# Patient Record
Sex: FEMALE | Race: WHITE | NOT HISPANIC OR LATINO | Employment: FULL TIME | URBAN - METROPOLITAN AREA
[De-identification: names, ages, dates, MRNs, and addresses within clinical notes are randomized per-mention and may not be internally consistent; named-entity substitution may affect disease eponyms.]

---

## 2017-04-21 ENCOUNTER — HISTORICAL (OUTPATIENT)
Dept: LAB | Facility: HOSPITAL | Age: 32
End: 2017-04-21

## 2017-04-21 LAB
BUN SERPL-MCNC: 16 MG/DL (ref 7–18)
CALCIUM SERPL-MCNC: 9 MG/DL (ref 8.5–10.1)
CHLORIDE SERPL-SCNC: 104 MMOL/L (ref 98–107)
CO2 SERPL-SCNC: 28.8 MMOL/L (ref 21–32)
CREAT SERPL-MCNC: 0.84 MG/DL (ref 0.6–1.3)
GLUCOSE SERPL-MCNC: 118 MG/DL (ref 74–106)
POTASSIUM SERPL-SCNC: 3.9 MMOL/L (ref 3.5–5.1)
SODIUM SERPL-SCNC: 141 MMOL/L (ref 136–145)

## 2019-05-31 ENCOUNTER — HISTORICAL (OUTPATIENT)
Dept: RADIOLOGY | Facility: HOSPITAL | Age: 34
End: 2019-05-31

## 2019-05-31 LAB
BUN SERPL-MCNC: 17 MG/DL (ref 7–18)
CREAT SERPL-MCNC: 1.09 MG/DL (ref 0.6–1.3)

## 2020-06-26 ENCOUNTER — HISTORICAL (OUTPATIENT)
Dept: LAB | Facility: HOSPITAL | Age: 35
End: 2020-06-26

## 2020-06-26 LAB
ABS NEUT (OLG): 3.61 X10(3)/MCL (ref 2.1–9.2)
ALBUMIN SERPL-MCNC: 4.1 GM/DL (ref 3.4–5)
ALBUMIN/GLOB SERPL: 1.2 RATIO (ref 1.1–2)
ALP SERPL-CCNC: 51 UNIT/L (ref 46–116)
ALT SERPL-CCNC: 15 UNIT/L (ref 12–78)
AST SERPL-CCNC: 15 UNIT/L (ref 15–37)
BASOPHILS # BLD AUTO: 0 X10(3)/MCL (ref 0–0.2)
BASOPHILS NFR BLD AUTO: 0 %
BILIRUB SERPL-MCNC: 0.6 MG/DL (ref 0.2–1)
BILIRUBIN DIRECT+TOT PNL SERPL-MCNC: 0.17 MG/DL (ref 0–0.2)
BILIRUBIN DIRECT+TOT PNL SERPL-MCNC: 0.43 MG/DL (ref 0–0.8)
BUN SERPL-MCNC: 12.9 MG/DL (ref 7–18)
CALCIUM SERPL-MCNC: 8.7 MG/DL (ref 8.5–10.1)
CHLORIDE SERPL-SCNC: 103 MMOL/L (ref 98–107)
CHOLEST SERPL-MCNC: 153 MG/DL (ref 0–200)
CHOLEST/HDLC SERPL: 1.9 {RATIO} (ref 0–4)
CK SERPL-CCNC: 73 UNIT/L (ref 21–232)
CO2 SERPL-SCNC: 28.5 MMOL/L (ref 21–32)
CREAT SERPL-MCNC: 0.62 MG/DL (ref 0.6–1.3)
EOSINOPHIL # BLD AUTO: 0.1 X10(3)/MCL (ref 0–0.9)
EOSINOPHIL NFR BLD AUTO: 2 %
ERYTHROCYTE [DISTWIDTH] IN BLOOD BY AUTOMATED COUNT: 13.1 % (ref 11.5–17)
GLOBULIN SER-MCNC: 3.4 GM/DL (ref 2.4–3.5)
GLUCOSE SERPL-MCNC: 104 MG/DL (ref 74–106)
HCT VFR BLD AUTO: 33 % (ref 37–47)
HDLC SERPL-MCNC: 79 MG/DL (ref 40–60)
HGB BLD-MCNC: 10.8 GM/DL (ref 12–16)
IMM GRANULOCYTES # BLD AUTO: 0.01 % (ref 0–0.02)
IMM GRANULOCYTES NFR BLD AUTO: 0.2 % (ref 0–0.43)
LDLC SERPL CALC-MCNC: 64 MG/DL (ref 0–129)
LYMPHOCYTES # BLD AUTO: 1.8 X10(3)/MCL (ref 0.6–4.6)
LYMPHOCYTES NFR BLD AUTO: 30 %
MCH RBC QN AUTO: 29.3 PG (ref 27–31)
MCHC RBC AUTO-ENTMCNC: 32.7 GM/DL (ref 33–36)
MCV RBC AUTO: 89.4 FL (ref 80–94)
MONOCYTES # BLD AUTO: 0.4 X10(3)/MCL (ref 0.1–1.3)
MONOCYTES NFR BLD AUTO: 7 %
NEUTROPHILS # BLD AUTO: 3.61 X10(3)/MCL (ref 1.4–7.9)
NEUTROPHILS NFR BLD AUTO: 60 %
PHOSPHATE SERPL-MCNC: 4.2 MG/DL (ref 2.5–4.9)
PLATELET # BLD AUTO: 251 X10(3)/MCL (ref 130–400)
PMV BLD AUTO: 10.5 FL (ref 9.4–12.4)
POTASSIUM SERPL-SCNC: 4.1 MMOL/L (ref 3.5–5.1)
PROT SERPL-MCNC: 7.5 GM/DL (ref 6.4–8.2)
RBC # BLD AUTO: 3.69 X10(6)/MCL (ref 4.2–5.4)
SODIUM SERPL-SCNC: 139 MMOL/L (ref 136–145)
TRIGL SERPL-MCNC: 48 MG/DL
TSH SERPL-ACNC: 1.93 MIU/ML (ref 0.36–3.74)
URATE SERPL-MCNC: 2.9 MG/DL (ref 2.6–7.2)
VLDLC SERPL CALC-MCNC: 10 MG/DL
WBC # SPEC AUTO: 6 X10(3)/MCL (ref 4.5–11.5)

## 2021-04-05 ENCOUNTER — HISTORICAL (OUTPATIENT)
Dept: LAB | Facility: HOSPITAL | Age: 36
End: 2021-04-05

## 2021-04-05 LAB
ABS NEUT (OLG): 3.04 X10(3)/MCL (ref 2.1–9.2)
ALBUMIN SERPL-MCNC: 4.1 GM/DL (ref 3.5–5)
ALBUMIN/GLOB SERPL: 1.3 RATIO (ref 1.1–2)
ALP SERPL-CCNC: 61 UNIT/L (ref 40–150)
ALT SERPL-CCNC: 11 UNIT/L (ref 0–55)
APPEARANCE, UA: CLEAR
AST SERPL-CCNC: 17 UNIT/L (ref 5–34)
BACTERIA SPEC CULT: NORMAL
BASOPHILS # BLD AUTO: 0 X10(3)/MCL (ref 0–0.2)
BASOPHILS NFR BLD AUTO: 1 %
BILIRUB SERPL-MCNC: 0.5 MG/DL
BILIRUB UR QL STRIP: NEGATIVE
BILIRUBIN DIRECT+TOT PNL SERPL-MCNC: 0.2 MG/DL (ref 0–0.8)
BILIRUBIN DIRECT+TOT PNL SERPL-MCNC: 0.3 MG/DL (ref 0–0.5)
BUN SERPL-MCNC: 14.6 MG/DL (ref 7–18.7)
CALCIUM SERPL-MCNC: 8.4 MG/DL (ref 8.4–10.2)
CHLORIDE SERPL-SCNC: 102 MMOL/L (ref 98–107)
CO2 SERPL-SCNC: 27 MMOL/L (ref 22–29)
COLOR UR: YELLOW
CREAT SERPL-MCNC: 0.7 MG/DL (ref 0.55–1.02)
EOSINOPHIL # BLD AUTO: 0.1 X10(3)/MCL (ref 0–0.9)
EOSINOPHIL NFR BLD AUTO: 1 %
ERYTHROCYTE [DISTWIDTH] IN BLOOD BY AUTOMATED COUNT: 15.6 % (ref 11.5–17)
GLOBULIN SER-MCNC: 3.1 GM/DL (ref 2.4–3.5)
GLUCOSE (UA): >=1000
GLUCOSE SERPL-MCNC: 79 MG/DL (ref 74–100)
HCT VFR BLD AUTO: 34.3 % (ref 37–47)
HGB BLD-MCNC: 10.3 GM/DL (ref 12–16)
HGB UR QL STRIP: NORMAL
IMM GRANULOCYTES # BLD AUTO: 0.01 % (ref 0–0.02)
IMM GRANULOCYTES NFR BLD AUTO: 0.2 % (ref 0–0.43)
KETONES UR QL STRIP: NEGATIVE
LEUKOCYTE ESTERASE UR QL STRIP: NEGATIVE
LYMPHOCYTES # BLD AUTO: 2.3 X10(3)/MCL (ref 0.6–4.6)
LYMPHOCYTES NFR BLD AUTO: 39 %
MCH RBC QN AUTO: 25.5 PG (ref 27–31)
MCHC RBC AUTO-ENTMCNC: 30 GM/DL (ref 33–36)
MCV RBC AUTO: 84.9 FL (ref 80–94)
MONOCYTES # BLD AUTO: 0.4 X10(3)/MCL (ref 0.1–1.3)
MONOCYTES NFR BLD AUTO: 6 %
NEUTROPHILS # BLD AUTO: 3.04 X10(3)/MCL (ref 1.4–7.9)
NEUTROPHILS NFR BLD AUTO: 52 %
NITRITE UR QL STRIP: NEGATIVE
PH UR STRIP: 5 [PH] (ref 5–9)
PLATELET # BLD AUTO: 275 X10(3)/MCL (ref 130–400)
PMV BLD AUTO: 10.2 FL (ref 9.4–12.4)
POTASSIUM SERPL-SCNC: 3.4 MMOL/L (ref 3.5–5.1)
PROT SERPL-MCNC: 7.2 GM/DL (ref 6.4–8.3)
PROT UR QL STRIP: NEGATIVE
RBC # BLD AUTO: 4.04 X10(6)/MCL (ref 4.2–5.4)
RBC #/AREA URNS HPF: NORMAL /HPF
SODIUM SERPL-SCNC: 139 MMOL/L (ref 136–145)
SP GR UR STRIP: >=1.03 (ref 1–1.03)
SQUAMOUS EPITHELIAL, UA: NORMAL
UROBILINOGEN UR STRIP-ACNC: 0.2
WBC # SPEC AUTO: 5.8 X10(3)/MCL (ref 4.5–11.5)
WBC #/AREA URNS HPF: NORMAL /[HPF]

## 2022-01-06 ENCOUNTER — HISTORICAL (OUTPATIENT)
Dept: LAB | Facility: HOSPITAL | Age: 37
End: 2022-01-06

## 2022-01-06 LAB
ABS NEUT (OLG): 3.14 X10(3)/MCL (ref 2.1–9.2)
ALBUMIN SERPL-MCNC: 3.9 GM/DL (ref 3.5–5)
ALBUMIN/GLOB SERPL: 1.2 RATIO (ref 1.1–2)
ALP SERPL-CCNC: 51 UNIT/L (ref 40–150)
ALT SERPL-CCNC: 18 UNIT/L (ref 0–55)
APPEARANCE, UA: CLEAR
AST SERPL-CCNC: 17 UNIT/L (ref 5–34)
BACTERIA SPEC CULT: ABNORMAL
BASOPHILS # BLD AUTO: 0 X10(3)/MCL (ref 0–0.2)
BASOPHILS NFR BLD AUTO: 0 %
BILIRUB SERPL-MCNC: 0.6 MG/DL
BILIRUB UR QL STRIP: NEGATIVE
BILIRUBIN DIRECT+TOT PNL SERPL-MCNC: 0.3 MG/DL (ref 0–0.5)
BILIRUBIN DIRECT+TOT PNL SERPL-MCNC: 0.3 MG/DL (ref 0–0.8)
BUN SERPL-MCNC: 10.8 MG/DL (ref 7–18.7)
CALCIUM SERPL-MCNC: 9.2 MG/DL (ref 8.7–10.5)
CHLORIDE SERPL-SCNC: 105 MMOL/L (ref 98–107)
CHOLEST SERPL-MCNC: 143 MG/DL
CHOLEST/HDLC SERPL: 2 {RATIO} (ref 0–5)
CO2 SERPL-SCNC: 27 MMOL/L (ref 22–29)
COLOR UR: YELLOW
CREAT SERPL-MCNC: 0.7 MG/DL (ref 0.55–1.02)
DEPRECATED CALCIDIOL+CALCIFEROL SERPL-MC: 74 NG/ML (ref 30–80)
EOSINOPHIL # BLD AUTO: 0.2 X10(3)/MCL (ref 0–0.9)
EOSINOPHIL NFR BLD AUTO: 3 %
ERYTHROCYTE [DISTWIDTH] IN BLOOD BY AUTOMATED COUNT: 12.4 % (ref 11.5–17)
EST. AVERAGE GLUCOSE BLD GHB EST-MCNC: 108.3 MG/DL
GLOBULIN SER-MCNC: 3.3 GM/DL (ref 2.4–3.5)
GLUCOSE (UA): ABNORMAL
GLUCOSE SERPL-MCNC: 90 MG/DL (ref 74–100)
HBA1C MFR BLD: 5.4 %
HCT VFR BLD AUTO: 36.8 % (ref 37–47)
HDLC SERPL-MCNC: 63 MG/DL (ref 35–60)
HGB BLD-MCNC: 11.9 GM/DL (ref 12–16)
HGB UR QL STRIP: NEGATIVE
KETONES UR QL STRIP: NEGATIVE
LDLC SERPL CALC-MCNC: 65 MG/DL (ref 50–140)
LEUKOCYTE ESTERASE UR QL STRIP: NEGATIVE
LYMPHOCYTES # BLD AUTO: 1.8 X10(3)/MCL (ref 0.6–4.6)
LYMPHOCYTES NFR BLD AUTO: 33 %
MCH RBC QN AUTO: 30.3 PG (ref 27–31)
MCHC RBC AUTO-ENTMCNC: 32.3 GM/DL (ref 33–36)
MCV RBC AUTO: 93.6 FL (ref 80–94)
MONOCYTES # BLD AUTO: 0.5 X10(3)/MCL (ref 0.1–1.3)
MONOCYTES NFR BLD AUTO: 9 %
NEUTROPHILS # BLD AUTO: 3.14 X10(3)/MCL (ref 1.4–7.9)
NEUTROPHILS NFR BLD AUTO: 56 %
NITRITE UR QL STRIP: NEGATIVE
PH UR STRIP: 5.5 [PH] (ref 5–9)
PLATELET # BLD AUTO: 234 X10(3)/MCL (ref 130–400)
PMV BLD AUTO: 10.3 FL (ref 9.4–12.4)
POTASSIUM SERPL-SCNC: 4 MMOL/L (ref 3.5–5.1)
PROT SERPL-MCNC: 7.2 GM/DL (ref 6.4–8.3)
PROT UR QL STRIP: NEGATIVE
RBC # BLD AUTO: 3.93 X10(6)/MCL (ref 4.2–5.4)
RBC #/AREA URNS HPF: ABNORMAL /[HPF]
SODIUM SERPL-SCNC: 140 MMOL/L (ref 136–145)
SP GR UR STRIP: >1.03 (ref 1–1.03)
SQUAMOUS EPITHELIAL, UA: ABNORMAL
TRIGL SERPL-MCNC: 73 MG/DL (ref 37–140)
TSH SERPL-ACNC: 1.35 UIU/ML (ref 0.35–4.94)
UROBILINOGEN UR STRIP-ACNC: 1
VIT B12 SERPL-MCNC: 194 PG/ML (ref 213–816)
VLDLC SERPL CALC-MCNC: 15 MG/DL
WBC # SPEC AUTO: 5.6 X10(3)/MCL (ref 4.5–11.5)
WBC #/AREA URNS HPF: ABNORMAL /HPF

## 2022-04-12 ENCOUNTER — HISTORICAL (OUTPATIENT)
Dept: ADMINISTRATIVE | Facility: HOSPITAL | Age: 37
End: 2022-04-12

## 2022-04-30 VITALS
OXYGEN SATURATION: 97 % | BODY MASS INDEX: 31.98 KG/M2 | SYSTOLIC BLOOD PRESSURE: 120 MMHG | WEIGHT: 211 LBS | DIASTOLIC BLOOD PRESSURE: 78 MMHG | HEIGHT: 68 IN

## 2022-06-04 PROBLEM — I10 HYPERTENSION: Status: ACTIVE | Noted: 2022-06-04

## 2022-06-04 PROBLEM — Z00.00 WELLNESS EXAMINATION: Status: ACTIVE | Noted: 2022-06-04

## 2022-06-04 PROBLEM — E11.9 DIABETES MELLITUS: Status: ACTIVE | Noted: 2022-06-04

## 2022-06-04 PROBLEM — M54.9 BACK PAIN: Status: ACTIVE | Noted: 2022-06-04

## 2022-06-04 PROBLEM — F41.9 ANXIETY: Status: ACTIVE | Noted: 2022-06-04

## 2022-06-04 PROBLEM — E55.9 VITAMIN D DEFICIENCY: Status: ACTIVE | Noted: 2022-06-04

## 2022-06-04 PROBLEM — E66.9 OBESITY: Status: ACTIVE | Noted: 2022-06-04

## 2022-06-10 PROBLEM — Z20.822 CONTACT WITH AND (SUSPECTED) EXPOSURE TO COVID-19: Status: ACTIVE | Noted: 2022-02-01

## 2022-06-10 PROBLEM — Z11.8 SCREENING FOR OTHER SPECIFIC VIRAL AND CHLAMYDIAL DISEASES: Status: ACTIVE | Noted: 2022-01-29

## 2022-06-10 PROBLEM — R03.0 ELEVATED BLOOD PRESSURE READING WITHOUT DIAGNOSIS OF HYPERTENSION: Status: ACTIVE | Noted: 2022-06-10

## 2022-06-10 PROBLEM — J06.9 URI, ACUTE: Status: ACTIVE | Noted: 2022-01-29

## 2022-06-10 PROBLEM — Z11.59 SCREENING FOR OTHER SPECIFIC VIRAL AND CHLAMYDIAL DISEASES: Status: ACTIVE | Noted: 2022-01-29

## 2022-06-10 PROBLEM — E78.5 HYPERLIPIDEMIA: Status: ACTIVE | Noted: 2022-06-10

## 2022-12-11 PROBLEM — R03.0 ELEVATED BLOOD PRESSURE READING WITHOUT DIAGNOSIS OF HYPERTENSION: Status: RESOLVED | Noted: 2022-06-10 | Resolved: 2022-12-11

## 2023-01-06 ENCOUNTER — LAB VISIT (OUTPATIENT)
Dept: LAB | Facility: HOSPITAL | Age: 38
End: 2023-01-06
Attending: FAMILY MEDICINE
Payer: COMMERCIAL

## 2023-01-06 DIAGNOSIS — Z00.00 WELLNESS EXAMINATION: ICD-10-CM

## 2023-01-06 LAB
ALBUMIN SERPL-MCNC: 4 G/DL (ref 3.5–5)
ALBUMIN/GLOB SERPL: 1.2 RATIO (ref 1.1–2)
ALP SERPL-CCNC: 45 UNIT/L (ref 40–150)
ALT SERPL-CCNC: 12 UNIT/L (ref 0–55)
APPEARANCE UR: CLEAR
AST SERPL-CCNC: 17 UNIT/L (ref 5–34)
BACTERIA #/AREA URNS AUTO: NORMAL /HPF
BASOPHILS # BLD AUTO: 0.02 X10(3)/MCL (ref 0–0.2)
BASOPHILS NFR BLD AUTO: 0.3 %
BILIRUB UR QL STRIP.AUTO: NEGATIVE MG/DL
BILIRUBIN DIRECT+TOT PNL SERPL-MCNC: 1.1 MG/DL
BUN SERPL-MCNC: 12.2 MG/DL (ref 7–18.7)
CALCIUM SERPL-MCNC: 9.7 MG/DL (ref 8.4–10.2)
CHLORIDE SERPL-SCNC: 106 MMOL/L (ref 98–107)
CHOLEST SERPL-MCNC: 142 MG/DL
CHOLEST/HDLC SERPL: 2 {RATIO} (ref 0–5)
CO2 SERPL-SCNC: 26 MMOL/L (ref 22–29)
COLOR UR AUTO: YELLOW
CREAT SERPL-MCNC: 0.71 MG/DL (ref 0.55–1.02)
DEPRECATED CALCIDIOL+CALCIFEROL SERPL-MC: 82.6 NG/ML (ref 30–80)
EOSINOPHIL # BLD AUTO: 0.09 X10(3)/MCL (ref 0–0.9)
EOSINOPHIL NFR BLD AUTO: 1.5 %
ERYTHROCYTE [DISTWIDTH] IN BLOOD BY AUTOMATED COUNT: 12.7 % (ref 11–14.5)
EST. AVERAGE GLUCOSE BLD GHB EST-MCNC: 108.3 MG/DL
GFR SERPLBLD CREATININE-BSD FMLA CKD-EPI: >60 MLS/MIN/1.73/M2
GLOBULIN SER-MCNC: 3.3 GM/DL (ref 2.4–3.5)
GLUCOSE SERPL-MCNC: 100 MG/DL (ref 74–100)
GLUCOSE UR QL STRIP.AUTO: >=1000 MG/DL
HBA1C MFR BLD: 5.4 %
HCT VFR BLD AUTO: 37.6 % (ref 37–47)
HDLC SERPL-MCNC: 64 MG/DL (ref 35–60)
HGB BLD-MCNC: 11.5 GM/DL (ref 12–16)
IMM GRANULOCYTES # BLD AUTO: 0.01 X10(3)/MCL (ref 0–0.04)
IMM GRANULOCYTES NFR BLD AUTO: 0.2 %
KETONES UR QL STRIP.AUTO: NEGATIVE MG/DL
LDLC SERPL CALC-MCNC: 67 MG/DL (ref 50–140)
LEUKOCYTE ESTERASE UR QL STRIP.AUTO: NEGATIVE UNIT/L
LYMPHOCYTES # BLD AUTO: 2.22 X10(3)/MCL (ref 0.6–4.6)
LYMPHOCYTES NFR BLD AUTO: 37.2 %
MCH RBC QN AUTO: 28.2 PG
MCHC RBC AUTO-ENTMCNC: 30.6 MG/DL (ref 33–36)
MCV RBC AUTO: 92.2 FL (ref 80–94)
MONOCYTES # BLD AUTO: 0.43 X10(3)/MCL (ref 0.1–1.3)
MONOCYTES NFR BLD AUTO: 7.2 %
NEUTROPHILS # BLD AUTO: 3.2 X10(3)/MCL (ref 2.1–9.2)
NEUTROPHILS NFR BLD AUTO: 53.6 %
NITRITE UR QL STRIP.AUTO: NEGATIVE
PH UR STRIP.AUTO: 5 [PH]
PLATELET # BLD AUTO: 295 X10(3)/MCL (ref 140–371)
PMV BLD AUTO: 10.1 FL (ref 9.4–12.4)
POTASSIUM SERPL-SCNC: 3.9 MMOL/L (ref 3.5–5.1)
PROT SERPL-MCNC: 7.3 GM/DL (ref 6.4–8.3)
PROT UR QL STRIP.AUTO: NEGATIVE MG/DL
RBC # BLD AUTO: 4.08 X10(6)/MCL (ref 4.2–5.4)
RBC #/AREA URNS AUTO: NORMAL /HPF
RBC UR QL AUTO: NEGATIVE UNIT/L
SODIUM SERPL-SCNC: 140 MMOL/L (ref 136–145)
SP GR UR STRIP.AUTO: >=1.03
SQUAMOUS #/AREA URNS AUTO: NORMAL /HPF
TRIGL SERPL-MCNC: 54 MG/DL (ref 37–140)
TSH SERPL-ACNC: 1.7 UIU/ML (ref 0.35–4.94)
UROBILINOGEN UR STRIP-ACNC: 0.2 MG/DL
VIT B12 SERPL-MCNC: 1815 PG/ML (ref 213–816)
VLDLC SERPL CALC-MCNC: 11 MG/DL
WBC # SPEC AUTO: 6 X10(3)/MCL (ref 4.5–11.5)
WBC #/AREA URNS AUTO: NORMAL /HPF

## 2023-01-06 PROCEDURE — 85025 COMPLETE CBC W/AUTO DIFF WBC: CPT

## 2023-01-06 PROCEDURE — 81001 URINALYSIS AUTO W/SCOPE: CPT

## 2023-01-06 PROCEDURE — 83036 HEMOGLOBIN GLYCOSYLATED A1C: CPT

## 2023-01-06 PROCEDURE — 82607 VITAMIN B-12: CPT

## 2023-01-06 PROCEDURE — 36415 COLL VENOUS BLD VENIPUNCTURE: CPT

## 2023-01-06 PROCEDURE — 82306 VITAMIN D 25 HYDROXY: CPT

## 2023-01-06 PROCEDURE — 84443 ASSAY THYROID STIM HORMONE: CPT

## 2023-01-06 PROCEDURE — 80053 COMPREHEN METABOLIC PANEL: CPT

## 2023-01-06 PROCEDURE — 80061 LIPID PANEL: CPT

## 2023-12-08 PROBLEM — Z00.00 WELLNESS EXAMINATION: Status: ACTIVE | Noted: 2022-01-29

## 2024-01-06 ENCOUNTER — LAB VISIT (OUTPATIENT)
Dept: LAB | Facility: HOSPITAL | Age: 39
End: 2024-01-06
Attending: FAMILY MEDICINE
Payer: COMMERCIAL

## 2024-01-06 DIAGNOSIS — Z00.00 WELLNESS EXAMINATION: ICD-10-CM

## 2024-01-06 LAB
ALBUMIN SERPL-MCNC: 3.8 G/DL (ref 3.5–5)
ALBUMIN/GLOB SERPL: 1.1 RATIO (ref 1.1–2)
ALP SERPL-CCNC: 55 UNIT/L (ref 40–150)
ALT SERPL-CCNC: 15 UNIT/L (ref 0–55)
APPEARANCE UR: CLEAR
AST SERPL-CCNC: 20 UNIT/L (ref 5–34)
BACTERIA #/AREA URNS AUTO: NORMAL /HPF
BASOPHILS # BLD AUTO: 0.03 X10(3)/MCL
BASOPHILS NFR BLD AUTO: 0.6 %
BILIRUB SERPL-MCNC: 1 MG/DL
BILIRUB UR QL STRIP.AUTO: NEGATIVE
BUN SERPL-MCNC: 13.7 MG/DL (ref 7–18.7)
CALCIUM SERPL-MCNC: 9.4 MG/DL (ref 8.4–10.2)
CHLORIDE SERPL-SCNC: 103 MMOL/L (ref 98–107)
CHOLEST SERPL-MCNC: 159 MG/DL
CHOLEST/HDLC SERPL: 2 {RATIO} (ref 0–5)
CO2 SERPL-SCNC: 25 MMOL/L (ref 22–29)
COLOR UR AUTO: YELLOW
CREAT SERPL-MCNC: 0.67 MG/DL (ref 0.55–1.02)
DEPRECATED CALCIDIOL+CALCIFEROL SERPL-MC: 74.9 NG/ML (ref 30–80)
EOSINOPHIL # BLD AUTO: 0.1 X10(3)/MCL (ref 0–0.9)
EOSINOPHIL NFR BLD AUTO: 1.9 %
ERYTHROCYTE [DISTWIDTH] IN BLOOD BY AUTOMATED COUNT: 12.7 % (ref 11.5–17)
EST. AVERAGE GLUCOSE BLD GHB EST-MCNC: 111.2 MG/DL
GFR SERPLBLD CREATININE-BSD FMLA CKD-EPI: >60 MLS/MIN/1.73/M2
GLOBULIN SER-MCNC: 3.6 GM/DL (ref 2.4–3.5)
GLUCOSE SERPL-MCNC: 93 MG/DL (ref 74–100)
GLUCOSE UR QL STRIP.AUTO: >=1000
HBA1C MFR BLD: 5.5 %
HCT VFR BLD AUTO: 37.2 % (ref 37–47)
HDLC SERPL-MCNC: 78 MG/DL (ref 35–60)
HGB BLD-MCNC: 11.9 G/DL (ref 12–16)
IMM GRANULOCYTES # BLD AUTO: 0 X10(3)/MCL (ref 0–0.04)
IMM GRANULOCYTES NFR BLD AUTO: 0 %
KETONES UR QL STRIP.AUTO: NEGATIVE
LDLC SERPL CALC-MCNC: 73 MG/DL (ref 50–140)
LEUKOCYTE ESTERASE UR QL STRIP.AUTO: NEGATIVE
LYMPHOCYTES # BLD AUTO: 1.55 X10(3)/MCL (ref 0.6–4.6)
LYMPHOCYTES NFR BLD AUTO: 29.9 %
MCH RBC QN AUTO: 29.5 PG (ref 27–31)
MCHC RBC AUTO-ENTMCNC: 32 G/DL (ref 33–36)
MCV RBC AUTO: 92.3 FL (ref 80–94)
MONOCYTES # BLD AUTO: 0.47 X10(3)/MCL (ref 0.1–1.3)
MONOCYTES NFR BLD AUTO: 9.1 %
NEUTROPHILS # BLD AUTO: 3.03 X10(3)/MCL (ref 2.1–9.2)
NEUTROPHILS NFR BLD AUTO: 58.5 %
NITRITE UR QL STRIP.AUTO: NEGATIVE
PH UR STRIP.AUTO: 7 [PH]
PLATELET # BLD AUTO: 231 X10(3)/MCL (ref 130–400)
PMV BLD AUTO: 9.9 FL (ref 7.4–10.4)
POTASSIUM SERPL-SCNC: 4.1 MMOL/L (ref 3.5–5.1)
PROT SERPL-MCNC: 7.4 GM/DL (ref 6.4–8.3)
PROT UR QL STRIP.AUTO: NEGATIVE
RBC # BLD AUTO: 4.03 X10(6)/MCL (ref 4.2–5.4)
RBC #/AREA URNS AUTO: NORMAL /HPF
RBC UR QL AUTO: NEGATIVE
SODIUM SERPL-SCNC: 138 MMOL/L (ref 136–145)
SP GR UR STRIP.AUTO: 1.02 (ref 1–1.03)
SQUAMOUS #/AREA URNS AUTO: NORMAL /HPF
T3FREE SERPL-MCNC: 2.89 PG/ML (ref 1.58–3.91)
T4 FREE SERPL-MCNC: 1.08 NG/DL (ref 0.7–1.48)
TRIGL SERPL-MCNC: 40 MG/DL (ref 37–140)
TSH SERPL-ACNC: 1.1 UIU/ML (ref 0.35–4.94)
UROBILINOGEN UR STRIP-ACNC: 1
VLDLC SERPL CALC-MCNC: 8 MG/DL
WBC # SPEC AUTO: 5.18 X10(3)/MCL (ref 4.5–11.5)
WBC #/AREA URNS AUTO: NORMAL /HPF

## 2024-01-06 PROCEDURE — 80053 COMPREHEN METABOLIC PANEL: CPT

## 2024-01-06 PROCEDURE — 36415 COLL VENOUS BLD VENIPUNCTURE: CPT

## 2024-01-06 PROCEDURE — 84443 ASSAY THYROID STIM HORMONE: CPT

## 2024-01-06 PROCEDURE — 82306 VITAMIN D 25 HYDROXY: CPT

## 2024-01-06 PROCEDURE — 84481 FREE ASSAY (FT-3): CPT

## 2024-01-06 PROCEDURE — 81003 URINALYSIS AUTO W/O SCOPE: CPT

## 2024-01-06 PROCEDURE — 84439 ASSAY OF FREE THYROXINE: CPT

## 2024-01-06 PROCEDURE — 83036 HEMOGLOBIN GLYCOSYLATED A1C: CPT

## 2024-01-06 PROCEDURE — 80061 LIPID PANEL: CPT

## 2024-01-06 PROCEDURE — 85025 COMPLETE CBC W/AUTO DIFF WBC: CPT

## 2024-03-11 PROBLEM — Z00.00 WELLNESS EXAMINATION: Status: RESOLVED | Noted: 2022-01-29 | Resolved: 2024-03-11

## 2025-01-18 ENCOUNTER — LAB VISIT (OUTPATIENT)
Dept: LAB | Facility: HOSPITAL | Age: 40
End: 2025-01-18
Attending: FAMILY MEDICINE
Payer: COMMERCIAL

## 2025-01-18 DIAGNOSIS — Z00.00 WELLNESS EXAMINATION: ICD-10-CM

## 2025-01-18 LAB
25(OH)D3+25(OH)D2 SERPL-MCNC: 74 NG/ML (ref 30–80)
ALBUMIN SERPL-MCNC: 3.9 G/DL (ref 3.5–5)
ALBUMIN/GLOB SERPL: 1.2 RATIO (ref 1.1–2)
ALP SERPL-CCNC: 46 UNIT/L (ref 40–150)
ALT SERPL-CCNC: 15 UNIT/L (ref 0–55)
ANION GAP SERPL CALC-SCNC: 5 MEQ/L
AST SERPL-CCNC: 17 UNIT/L (ref 5–34)
BACTERIA #/AREA URNS AUTO: ABNORMAL /HPF
BASOPHILS # BLD AUTO: 0.03 X10(3)/MCL
BASOPHILS NFR BLD AUTO: 0.5 %
BILIRUB SERPL-MCNC: 0.8 MG/DL
BILIRUB UR QL STRIP.AUTO: NEGATIVE
BUN SERPL-MCNC: 13.2 MG/DL (ref 7–18.7)
CALCIUM SERPL-MCNC: 9.2 MG/DL (ref 8.4–10.2)
CHLORIDE SERPL-SCNC: 107 MMOL/L (ref 98–107)
CHOLEST SERPL-MCNC: 143 MG/DL
CHOLEST/HDLC SERPL: 2 {RATIO} (ref 0–5)
CLARITY UR: CLEAR
CO2 SERPL-SCNC: 28 MMOL/L (ref 22–29)
COLOR UR AUTO: YELLOW
CREAT SERPL-MCNC: 0.73 MG/DL (ref 0.55–1.02)
CREAT UR-MCNC: 137.7 MG/DL (ref 45–106)
CREAT/UREA NIT SERPL: 18
EOSINOPHIL # BLD AUTO: 0.07 X10(3)/MCL (ref 0–0.9)
EOSINOPHIL NFR BLD AUTO: 1.3 %
ERYTHROCYTE [DISTWIDTH] IN BLOOD BY AUTOMATED COUNT: 14.5 % (ref 11.5–17)
EST. AVERAGE GLUCOSE BLD GHB EST-MCNC: 108.3 MG/DL
FOLATE SERPL-MCNC: 8.3 NG/ML (ref 7–31.4)
GFR SERPLBLD CREATININE-BSD FMLA CKD-EPI: >60 ML/MIN/1.73/M2
GLOBULIN SER-MCNC: 3.2 GM/DL (ref 2.4–3.5)
GLUCOSE SERPL-MCNC: 97 MG/DL (ref 74–100)
GLUCOSE UR QL STRIP: >=1000
HBA1C MFR BLD: 5.4 %
HCT VFR BLD AUTO: 36.4 % (ref 37–47)
HDLC SERPL-MCNC: 76 MG/DL (ref 35–60)
HGB BLD-MCNC: 11.9 G/DL (ref 12–16)
HGB UR QL STRIP: NEGATIVE
IMM GRANULOCYTES # BLD AUTO: 0.01 X10(3)/MCL (ref 0–0.04)
IMM GRANULOCYTES NFR BLD AUTO: 0.2 %
KETONES UR QL STRIP: NEGATIVE
LDLC SERPL CALC-MCNC: 60 MG/DL (ref 50–140)
LEUKOCYTE ESTERASE UR QL STRIP: NEGATIVE
LYMPHOCYTES # BLD AUTO: 1.52 X10(3)/MCL (ref 0.6–4.6)
LYMPHOCYTES NFR BLD AUTO: 27.3 %
MCH RBC QN AUTO: 30.7 PG (ref 27–31)
MCHC RBC AUTO-ENTMCNC: 32.7 G/DL (ref 33–36)
MCV RBC AUTO: 93.8 FL (ref 80–94)
MICROALBUMIN UR-MCNC: 9.4 UG/ML
MICROALBUMIN/CREAT RATIO PNL UR: 6.8 MG/GM CR (ref 0–30)
MONOCYTES # BLD AUTO: 0.36 X10(3)/MCL (ref 0.1–1.3)
MONOCYTES NFR BLD AUTO: 6.5 %
NEUTROPHILS # BLD AUTO: 3.57 X10(3)/MCL (ref 2.1–9.2)
NEUTROPHILS NFR BLD AUTO: 64.2 %
NITRITE UR QL STRIP: NEGATIVE
PH UR STRIP: 6 [PH]
PLATELET # BLD AUTO: 207 X10(3)/MCL (ref 130–400)
PMV BLD AUTO: 10.5 FL (ref 7.4–10.4)
POTASSIUM SERPL-SCNC: 4.5 MMOL/L (ref 3.5–5.1)
PROT SERPL-MCNC: 7.1 GM/DL (ref 6.4–8.3)
PROT UR QL STRIP: NEGATIVE
RBC # BLD AUTO: 3.88 X10(6)/MCL (ref 4.2–5.4)
RBC #/AREA URNS AUTO: ABNORMAL /HPF
SODIUM SERPL-SCNC: 140 MMOL/L (ref 136–145)
SP GR UR STRIP.AUTO: >=1.03 (ref 1–1.03)
SQUAMOUS #/AREA URNS AUTO: ABNORMAL /HPF
T3FREE SERPL-MCNC: 2.56 PG/ML (ref 1.58–3.91)
T4 FREE SERPL-MCNC: 1.06 NG/DL (ref 0.7–1.48)
TRIGL SERPL-MCNC: 33 MG/DL (ref 37–140)
TSH SERPL-ACNC: 1.72 UIU/ML (ref 0.35–4.94)
UROBILINOGEN UR STRIP-ACNC: 1
VIT B12 SERPL-MCNC: >2000 PG/ML (ref 213–816)
VLDLC SERPL CALC-MCNC: 7 MG/DL
WBC # BLD AUTO: 5.56 X10(3)/MCL (ref 4.5–11.5)
WBC #/AREA URNS AUTO: ABNORMAL /HPF

## 2025-01-18 PROCEDURE — 82306 VITAMIN D 25 HYDROXY: CPT

## 2025-01-18 PROCEDURE — 80053 COMPREHEN METABOLIC PANEL: CPT

## 2025-01-18 PROCEDURE — 83036 HEMOGLOBIN GLYCOSYLATED A1C: CPT

## 2025-01-18 PROCEDURE — 82746 ASSAY OF FOLIC ACID SERUM: CPT

## 2025-01-18 PROCEDURE — 85025 COMPLETE CBC W/AUTO DIFF WBC: CPT

## 2025-01-18 PROCEDURE — 82043 UR ALBUMIN QUANTITATIVE: CPT

## 2025-01-18 PROCEDURE — 84439 ASSAY OF FREE THYROXINE: CPT

## 2025-01-18 PROCEDURE — 81003 URINALYSIS AUTO W/O SCOPE: CPT

## 2025-01-18 PROCEDURE — 80061 LIPID PANEL: CPT

## 2025-01-18 PROCEDURE — 84481 FREE ASSAY (FT-3): CPT

## 2025-01-18 PROCEDURE — 36415 COLL VENOUS BLD VENIPUNCTURE: CPT

## 2025-01-18 PROCEDURE — 84443 ASSAY THYROID STIM HORMONE: CPT

## 2025-01-18 PROCEDURE — 82607 VITAMIN B-12: CPT

## 2025-02-19 ENCOUNTER — HOSPITAL ENCOUNTER (EMERGENCY)
Facility: HOSPITAL | Age: 40
Discharge: ANOTHER HOSPITAL | End: 2025-02-20
Attending: EMERGENCY MEDICINE
Payer: COMMERCIAL

## 2025-02-19 DIAGNOSIS — R50.9 FEVER, UNSPECIFIED FEVER CAUSE: ICD-10-CM

## 2025-02-19 DIAGNOSIS — K81.0 ACUTE CHOLECYSTITIS: Primary | ICD-10-CM

## 2025-02-19 DIAGNOSIS — R10.11 RIGHT UPPER QUADRANT ABDOMINAL PAIN: ICD-10-CM

## 2025-02-19 LAB
ALBUMIN SERPL-MCNC: 3.8 G/DL (ref 3.5–5)
ALBUMIN/GLOB SERPL: 1.1 RATIO (ref 1.1–2)
ALP SERPL-CCNC: 40 UNIT/L (ref 40–150)
ALT SERPL-CCNC: 13 UNIT/L (ref 0–55)
ANION GAP SERPL CALC-SCNC: 11 MEQ/L
AST SERPL-CCNC: 18 UNIT/L (ref 5–34)
BACTERIA #/AREA URNS AUTO: ABNORMAL /HPF
BASOPHILS # BLD AUTO: 0.02 X10(3)/MCL
BASOPHILS NFR BLD AUTO: 0.2 %
BILIRUB SERPL-MCNC: 1.4 MG/DL
BILIRUB UR QL STRIP.AUTO: ABNORMAL
BUN SERPL-MCNC: 18.9 MG/DL (ref 7–18.7)
CALCIUM SERPL-MCNC: 8.8 MG/DL (ref 8.4–10.2)
CHLORIDE SERPL-SCNC: 104 MMOL/L (ref 98–107)
CLARITY UR: CLEAR
CO2 SERPL-SCNC: 23 MMOL/L (ref 22–29)
COLOR UR AUTO: YELLOW
CREAT SERPL-MCNC: 0.65 MG/DL (ref 0.55–1.02)
CREAT/UREA NIT SERPL: 29
EOSINOPHIL # BLD AUTO: 0 X10(3)/MCL (ref 0–0.9)
EOSINOPHIL NFR BLD AUTO: 0 %
ERYTHROCYTE [DISTWIDTH] IN BLOOD BY AUTOMATED COUNT: 13.3 % (ref 11.5–17)
GFR SERPLBLD CREATININE-BSD FMLA CKD-EPI: >60 ML/MIN/1.73/M2
GLOBULIN SER-MCNC: 3.5 GM/DL (ref 2.4–3.5)
GLUCOSE SERPL-MCNC: 98 MG/DL (ref 74–100)
GLUCOSE UR QL STRIP: 500
HCT VFR BLD AUTO: 38.1 % (ref 37–47)
HGB BLD-MCNC: 12.8 G/DL (ref 12–16)
HGB UR QL STRIP: ABNORMAL
IMM GRANULOCYTES # BLD AUTO: 0.01 X10(3)/MCL (ref 0–0.04)
IMM GRANULOCYTES NFR BLD AUTO: 0.1 %
KETONES UR QL STRIP: >=80
LACTATE SERPL-SCNC: 1.1 MMOL/L (ref 0.5–2.2)
LEUKOCYTE ESTERASE UR QL STRIP: NEGATIVE
LIPASE SERPL-CCNC: 18 U/L
LYMPHOCYTES # BLD AUTO: 0.19 X10(3)/MCL (ref 0.6–4.6)
LYMPHOCYTES NFR BLD AUTO: 2.1 %
MCH RBC QN AUTO: 31.4 PG (ref 27–31)
MCHC RBC AUTO-ENTMCNC: 33.6 G/DL (ref 33–36)
MCV RBC AUTO: 93.6 FL (ref 80–94)
MONOCYTES # BLD AUTO: 0.25 X10(3)/MCL (ref 0.1–1.3)
MONOCYTES NFR BLD AUTO: 2.8 %
NEUTROPHILS # BLD AUTO: 8.51 X10(3)/MCL (ref 2.1–9.2)
NEUTROPHILS NFR BLD AUTO: 94.8 %
NITRITE UR QL STRIP: NEGATIVE
PH UR STRIP: 5.5 [PH]
PLATELET # BLD AUTO: 195 X10(3)/MCL (ref 130–400)
PMV BLD AUTO: 10.1 FL (ref 7.4–10.4)
POTASSIUM SERPL-SCNC: 3.8 MMOL/L (ref 3.5–5.1)
PROT SERPL-MCNC: 7.3 GM/DL (ref 6.4–8.3)
PROT UR QL STRIP: NEGATIVE
RBC # BLD AUTO: 4.07 X10(6)/MCL (ref 4.2–5.4)
RBC #/AREA URNS AUTO: ABNORMAL /HPF
SODIUM SERPL-SCNC: 138 MMOL/L (ref 136–145)
SP GR UR STRIP.AUTO: >=1.03 (ref 1–1.03)
SQUAMOUS #/AREA URNS AUTO: ABNORMAL /HPF
UROBILINOGEN UR STRIP-ACNC: 0.2
WBC # BLD AUTO: 8.98 X10(3)/MCL (ref 4.5–11.5)
WBC #/AREA URNS AUTO: ABNORMAL /HPF

## 2025-02-19 PROCEDURE — 87040 BLOOD CULTURE FOR BACTERIA: CPT | Performed by: EMERGENCY MEDICINE

## 2025-02-19 PROCEDURE — 83690 ASSAY OF LIPASE: CPT | Performed by: EMERGENCY MEDICINE

## 2025-02-19 PROCEDURE — 80053 COMPREHEN METABOLIC PANEL: CPT | Performed by: EMERGENCY MEDICINE

## 2025-02-19 PROCEDURE — 85025 COMPLETE CBC W/AUTO DIFF WBC: CPT | Performed by: EMERGENCY MEDICINE

## 2025-02-19 PROCEDURE — 81003 URINALYSIS AUTO W/O SCOPE: CPT | Performed by: EMERGENCY MEDICINE

## 2025-02-19 PROCEDURE — 99285 EMERGENCY DEPT VISIT HI MDM: CPT | Mod: 25

## 2025-02-19 PROCEDURE — 83605 ASSAY OF LACTIC ACID: CPT | Performed by: EMERGENCY MEDICINE

## 2025-02-19 PROCEDURE — 96361 HYDRATE IV INFUSION ADD-ON: CPT

## 2025-02-19 PROCEDURE — 25000003 PHARM REV CODE 250: Performed by: EMERGENCY MEDICINE

## 2025-02-19 PROCEDURE — 25500020 PHARM REV CODE 255: Performed by: EMERGENCY MEDICINE

## 2025-02-19 PROCEDURE — 63600175 PHARM REV CODE 636 W HCPCS: Performed by: EMERGENCY MEDICINE

## 2025-02-19 PROCEDURE — 96365 THER/PROPH/DIAG IV INF INIT: CPT

## 2025-02-19 PROCEDURE — 96375 TX/PRO/DX INJ NEW DRUG ADDON: CPT

## 2025-02-19 PROCEDURE — 96367 TX/PROPH/DG ADDL SEQ IV INF: CPT

## 2025-02-19 RX ORDER — HYDROMORPHONE HYDROCHLORIDE 2 MG/ML
0.5 INJECTION, SOLUTION INTRAMUSCULAR; INTRAVENOUS; SUBCUTANEOUS
Refills: 0 | Status: COMPLETED | OUTPATIENT
Start: 2025-02-19 | End: 2025-02-19

## 2025-02-19 RX ORDER — ACETAMINOPHEN 10 MG/ML
1000 INJECTION, SOLUTION INTRAVENOUS ONCE
Status: COMPLETED | OUTPATIENT
Start: 2025-02-19 | End: 2025-02-19

## 2025-02-19 RX ORDER — KETOROLAC TROMETHAMINE 30 MG/ML
30 INJECTION, SOLUTION INTRAMUSCULAR; INTRAVENOUS
Status: COMPLETED | OUTPATIENT
Start: 2025-02-19 | End: 2025-02-19

## 2025-02-19 RX ORDER — ONDANSETRON HYDROCHLORIDE 2 MG/ML
8 INJECTION, SOLUTION INTRAVENOUS
Status: COMPLETED | OUTPATIENT
Start: 2025-02-19 | End: 2025-02-19

## 2025-02-19 RX ORDER — SODIUM CHLORIDE 9 MG/ML
1000 INJECTION, SOLUTION INTRAVENOUS
Status: COMPLETED | OUTPATIENT
Start: 2025-02-19 | End: 2025-02-19

## 2025-02-19 RX ADMIN — SODIUM CHLORIDE 1000 ML: 9 INJECTION, SOLUTION INTRAVENOUS at 07:02

## 2025-02-19 RX ADMIN — SODIUM CHLORIDE 1000 ML: 9 INJECTION, SOLUTION INTRAVENOUS at 09:02

## 2025-02-19 RX ADMIN — IOHEXOL 100 ML: 350 INJECTION, SOLUTION INTRAVENOUS at 08:02

## 2025-02-19 RX ADMIN — ONDANSETRON 8 MG: 2 INJECTION INTRAMUSCULAR; INTRAVENOUS at 07:02

## 2025-02-19 RX ADMIN — ACETAMINOPHEN 1000 MG: 10 INJECTION, SOLUTION INTRAVENOUS at 09:02

## 2025-02-19 RX ADMIN — PIPERACILLIN SODIUM AND TAZOBACTAM SODIUM 4.5 G: 4; .5 INJECTION, POWDER, LYOPHILIZED, FOR SOLUTION INTRAVENOUS at 08:02

## 2025-02-19 RX ADMIN — KETOROLAC TROMETHAMINE 30 MG: 30 INJECTION, SOLUTION INTRAMUSCULAR at 07:02

## 2025-02-19 RX ADMIN — HYDROMORPHONE HYDROCHLORIDE 0.5 MG: 2 INJECTION INTRAMUSCULAR; INTRAVENOUS; SUBCUTANEOUS at 07:02

## 2025-02-20 ENCOUNTER — ANESTHESIA (OUTPATIENT)
Dept: SURGERY | Facility: HOSPITAL | Age: 40
End: 2025-02-20
Payer: COMMERCIAL

## 2025-02-20 ENCOUNTER — ANESTHESIA EVENT (OUTPATIENT)
Dept: SURGERY | Facility: HOSPITAL | Age: 40
End: 2025-02-20
Payer: COMMERCIAL

## 2025-02-20 ENCOUNTER — HOSPITAL ENCOUNTER (INPATIENT)
Facility: HOSPITAL | Age: 40
LOS: 1 days | Discharge: HOME | DRG: 419 | End: 2025-02-21
Attending: INTERNAL MEDICINE | Admitting: INTERNAL MEDICINE
Payer: COMMERCIAL

## 2025-02-20 VITALS
RESPIRATION RATE: 18 BRPM | DIASTOLIC BLOOD PRESSURE: 59 MMHG | HEIGHT: 68 IN | BODY MASS INDEX: 24.25 KG/M2 | HEART RATE: 85 BPM | OXYGEN SATURATION: 98 % | SYSTOLIC BLOOD PRESSURE: 97 MMHG | TEMPERATURE: 102 F | WEIGHT: 160 LBS

## 2025-02-20 DIAGNOSIS — K80.18 CALCULUS OF GALLBLADDER WITH OTHER CHOLECYSTITIS WITHOUT OBSTRUCTION: Primary | ICD-10-CM

## 2025-02-20 DIAGNOSIS — R10.11 RIGHT UPPER QUADRANT PAIN: ICD-10-CM

## 2025-02-20 DIAGNOSIS — R00.0 TACHYCARDIA: ICD-10-CM

## 2025-02-20 LAB
ALBUMIN SERPL-MCNC: 3.2 G/DL (ref 3.5–5)
ALBUMIN/GLOB SERPL: 1.1 RATIO (ref 1.1–2)
ALP SERPL-CCNC: 30 UNIT/L (ref 40–150)
ALT SERPL-CCNC: 9 UNIT/L (ref 0–55)
ANION GAP SERPL CALC-SCNC: 7 MEQ/L
AST SERPL-CCNC: 14 UNIT/L (ref 5–34)
B-HCG UR QL: NEGATIVE
BILIRUB SERPL-MCNC: 1.1 MG/DL
BUN SERPL-MCNC: 15 MG/DL (ref 7–18.7)
CALCIUM SERPL-MCNC: 8.1 MG/DL (ref 8.4–10.2)
CHLORIDE SERPL-SCNC: 105 MMOL/L (ref 98–107)
CO2 SERPL-SCNC: 25 MMOL/L (ref 22–29)
CREAT SERPL-MCNC: 0.75 MG/DL (ref 0.55–1.02)
CREAT/UREA NIT SERPL: 20
ERYTHROCYTE [DISTWIDTH] IN BLOOD BY AUTOMATED COUNT: 13.8 % (ref 11.5–17)
EST. AVERAGE GLUCOSE BLD GHB EST-MCNC: 102.5 MG/DL
GFR SERPLBLD CREATININE-BSD FMLA CKD-EPI: >60 ML/MIN/1.73/M2
GLOBULIN SER-MCNC: 2.9 GM/DL (ref 2.4–3.5)
GLUCOSE SERPL-MCNC: 95 MG/DL (ref 74–100)
HBA1C MFR BLD: 5.2 %
HCT VFR BLD AUTO: 33.8 % (ref 37–47)
HGB BLD-MCNC: 11.3 G/DL (ref 12–16)
LACTATE SERPL-SCNC: 0.8 MMOL/L (ref 0.5–2.2)
MCH RBC QN AUTO: 30.9 PG (ref 27–31)
MCHC RBC AUTO-ENTMCNC: 33.4 G/DL (ref 33–36)
MCV RBC AUTO: 92.3 FL (ref 80–94)
NRBC BLD AUTO-RTO: 0 %
OHS QRS DURATION: 78 MS
OHS QTC CALCULATION: 465 MS
PLATELET # BLD AUTO: 158 X10(3)/MCL (ref 130–400)
PMV BLD AUTO: 9.8 FL (ref 7.4–10.4)
POCT GLUCOSE: 119 MG/DL (ref 70–110)
POCT GLUCOSE: 92 MG/DL (ref 70–110)
POCT GLUCOSE: 96 MG/DL (ref 70–110)
POCT GLUCOSE: 97 MG/DL (ref 70–110)
POTASSIUM SERPL-SCNC: 3.6 MMOL/L (ref 3.5–5.1)
PROT SERPL-MCNC: 6.1 GM/DL (ref 6.4–8.3)
RBC # BLD AUTO: 3.66 X10(6)/MCL (ref 4.2–5.4)
SODIUM SERPL-SCNC: 137 MMOL/L (ref 136–145)
TSH SERPL-ACNC: 0.9 UIU/ML (ref 0.35–4.94)
WBC # BLD AUTO: 5.87 X10(3)/MCL (ref 4.5–11.5)

## 2025-02-20 PROCEDURE — 71000033 HC RECOVERY, INTIAL HOUR: Performed by: SURGERY

## 2025-02-20 PROCEDURE — 93010 ELECTROCARDIOGRAM REPORT: CPT | Mod: ,,, | Performed by: INTERNAL MEDICINE

## 2025-02-20 PROCEDURE — 83036 HEMOGLOBIN GLYCOSYLATED A1C: CPT | Performed by: INTERNAL MEDICINE

## 2025-02-20 PROCEDURE — 63600175 PHARM REV CODE 636 W HCPCS: Mod: JZ,TB | Performed by: NURSE ANESTHETIST, CERTIFIED REGISTERED

## 2025-02-20 PROCEDURE — 36000709 HC OR TIME LEV III EA ADD 15 MIN: Performed by: SURGERY

## 2025-02-20 PROCEDURE — 85027 COMPLETE CBC AUTOMATED: CPT | Performed by: INTERNAL MEDICINE

## 2025-02-20 PROCEDURE — 81025 URINE PREGNANCY TEST: CPT | Performed by: SURGERY

## 2025-02-20 PROCEDURE — 63600175 PHARM REV CODE 636 W HCPCS: Mod: JZ,TB | Performed by: INTERNAL MEDICINE

## 2025-02-20 PROCEDURE — 0FT44ZZ RESECTION OF GALLBLADDER, PERCUTANEOUS ENDOSCOPIC APPROACH: ICD-10-PCS | Performed by: SURGERY

## 2025-02-20 PROCEDURE — 93005 ELECTROCARDIOGRAM TRACING: CPT

## 2025-02-20 PROCEDURE — 37000009 HC ANESTHESIA EA ADD 15 MINS: Performed by: SURGERY

## 2025-02-20 PROCEDURE — 83605 ASSAY OF LACTIC ACID: CPT | Performed by: INTERNAL MEDICINE

## 2025-02-20 PROCEDURE — 11000001 HC ACUTE MED/SURG PRIVATE ROOM

## 2025-02-20 PROCEDURE — 25000003 PHARM REV CODE 250: Performed by: NURSE ANESTHETIST, CERTIFIED REGISTERED

## 2025-02-20 PROCEDURE — BF131ZZ FLUOROSCOPY OF GALLBLADDER AND BILE DUCTS USING LOW OSMOLAR CONTRAST: ICD-10-PCS | Performed by: SURGERY

## 2025-02-20 PROCEDURE — 37000008 HC ANESTHESIA 1ST 15 MINUTES: Performed by: SURGERY

## 2025-02-20 PROCEDURE — 84443 ASSAY THYROID STIM HORMONE: CPT | Performed by: INTERNAL MEDICINE

## 2025-02-20 PROCEDURE — 63600175 PHARM REV CODE 636 W HCPCS: Mod: JZ,TB | Performed by: EMERGENCY MEDICINE

## 2025-02-20 PROCEDURE — 96361 HYDRATE IV INFUSION ADD-ON: CPT

## 2025-02-20 PROCEDURE — 27201423 OPTIME MED/SURG SUP & DEVICES STERILE SUPPLY: Performed by: SURGERY

## 2025-02-20 PROCEDURE — 36415 COLL VENOUS BLD VENIPUNCTURE: CPT | Performed by: INTERNAL MEDICINE

## 2025-02-20 PROCEDURE — 80053 COMPREHEN METABOLIC PANEL: CPT | Performed by: INTERNAL MEDICINE

## 2025-02-20 PROCEDURE — 96376 TX/PRO/DX INJ SAME DRUG ADON: CPT

## 2025-02-20 PROCEDURE — 36000708 HC OR TIME LEV III 1ST 15 MIN: Performed by: SURGERY

## 2025-02-20 PROCEDURE — 63600175 PHARM REV CODE 636 W HCPCS: Performed by: SURGERY

## 2025-02-20 PROCEDURE — 94761 N-INVAS EAR/PLS OXIMETRY MLT: CPT

## 2025-02-20 PROCEDURE — 25000003 PHARM REV CODE 250: Performed by: INTERNAL MEDICINE

## 2025-02-20 PROCEDURE — 63600175 PHARM REV CODE 636 W HCPCS: Performed by: NURSE ANESTHETIST, CERTIFIED REGISTERED

## 2025-02-20 RX ORDER — LIDOCAINE HYDROCHLORIDE 20 MG/ML
INJECTION, SOLUTION EPIDURAL; INFILTRATION; INTRACAUDAL; PERINEURAL
Status: DISCONTINUED | OUTPATIENT
Start: 2025-02-20 | End: 2025-02-20

## 2025-02-20 RX ORDER — SEMAGLUTIDE 2.68 MG/ML
2 INJECTION, SOLUTION SUBCUTANEOUS
COMMUNITY

## 2025-02-20 RX ORDER — INSULIN ASPART 100 [IU]/ML
0-5 INJECTION, SOLUTION INTRAVENOUS; SUBCUTANEOUS EVERY 6 HOURS PRN
Status: DISCONTINUED | OUTPATIENT
Start: 2025-02-20 | End: 2025-02-21 | Stop reason: HOSPADM

## 2025-02-20 RX ORDER — HYDROMORPHONE HYDROCHLORIDE 2 MG/ML
0.5 INJECTION, SOLUTION INTRAMUSCULAR; INTRAVENOUS; SUBCUTANEOUS
Refills: 0 | Status: COMPLETED | OUTPATIENT
Start: 2025-02-20 | End: 2025-02-20

## 2025-02-20 RX ORDER — GLUCAGON 1 MG
1 KIT INJECTION
Status: DISCONTINUED | OUTPATIENT
Start: 2025-02-20 | End: 2025-02-20

## 2025-02-20 RX ORDER — ONDANSETRON HYDROCHLORIDE 2 MG/ML
INJECTION, SOLUTION INTRAVENOUS
Status: DISCONTINUED | OUTPATIENT
Start: 2025-02-20 | End: 2025-02-20

## 2025-02-20 RX ORDER — HYDROMORPHONE HYDROCHLORIDE 2 MG/ML
0.5 INJECTION, SOLUTION INTRAMUSCULAR; INTRAVENOUS; SUBCUTANEOUS EVERY 6 HOURS PRN
Status: DISCONTINUED | OUTPATIENT
Start: 2025-02-20 | End: 2025-02-20

## 2025-02-20 RX ORDER — ONDANSETRON HYDROCHLORIDE 2 MG/ML
4 INJECTION, SOLUTION INTRAVENOUS
Status: COMPLETED | OUTPATIENT
Start: 2025-02-20 | End: 2025-02-20

## 2025-02-20 RX ORDER — ONDANSETRON 4 MG/1
4 TABLET, ORALLY DISINTEGRATING ORAL
Status: DISCONTINUED | OUTPATIENT
Start: 2025-02-20 | End: 2025-02-20

## 2025-02-20 RX ORDER — PHENYLEPHRINE HYDROCHLORIDE 10 MG/ML
INJECTION INTRAVENOUS
Status: DISCONTINUED | OUTPATIENT
Start: 2025-02-20 | End: 2025-02-20

## 2025-02-20 RX ORDER — ONDANSETRON HYDROCHLORIDE 2 MG/ML
4 INJECTION, SOLUTION INTRAVENOUS EVERY 6 HOURS PRN
Status: DISCONTINUED | OUTPATIENT
Start: 2025-02-20 | End: 2025-02-21 | Stop reason: HOSPADM

## 2025-02-20 RX ORDER — ONDANSETRON 4 MG/1
8 TABLET, ORALLY DISINTEGRATING ORAL EVERY 8 HOURS PRN
Status: CANCELLED | OUTPATIENT
Start: 2025-02-20

## 2025-02-20 RX ORDER — PANTOPRAZOLE SODIUM 40 MG/10ML
40 INJECTION, POWDER, LYOPHILIZED, FOR SOLUTION INTRAVENOUS DAILY
Status: DISCONTINUED | OUTPATIENT
Start: 2025-02-20 | End: 2025-02-21 | Stop reason: HOSPADM

## 2025-02-20 RX ORDER — TRAMADOL HYDROCHLORIDE 50 MG/1
50 TABLET ORAL EVERY 4 HOURS PRN
Status: DISCONTINUED | OUTPATIENT
Start: 2025-02-20 | End: 2025-02-21 | Stop reason: HOSPADM

## 2025-02-20 RX ORDER — GLUCAGON 1 MG
1 KIT INJECTION
Status: DISCONTINUED | OUTPATIENT
Start: 2025-02-20 | End: 2025-02-21 | Stop reason: HOSPADM

## 2025-02-20 RX ORDER — BUPIVACAINE HYDROCHLORIDE 2.5 MG/ML
INJECTION, SOLUTION EPIDURAL; INFILTRATION; INTRACAUDAL
Status: DISCONTINUED | OUTPATIENT
Start: 2025-02-20 | End: 2025-02-20 | Stop reason: HOSPADM

## 2025-02-20 RX ORDER — FENTANYL CITRATE 50 UG/ML
INJECTION, SOLUTION INTRAMUSCULAR; INTRAVENOUS
Status: DISCONTINUED | OUTPATIENT
Start: 2025-02-20 | End: 2025-02-20

## 2025-02-20 RX ORDER — SODIUM CHLORIDE 9 MG/ML
INJECTION, SOLUTION INTRAVENOUS CONTINUOUS
Status: DISCONTINUED | OUTPATIENT
Start: 2025-02-20 | End: 2025-02-21 | Stop reason: HOSPADM

## 2025-02-20 RX ORDER — HYDROMORPHONE HYDROCHLORIDE 2 MG/ML
0.2 INJECTION, SOLUTION INTRAMUSCULAR; INTRAVENOUS; SUBCUTANEOUS EVERY 5 MIN PRN
Refills: 0 | Status: DISCONTINUED | OUTPATIENT
Start: 2025-02-20 | End: 2025-02-20

## 2025-02-20 RX ORDER — SODIUM CHLORIDE 9 MG/ML
INJECTION, SOLUTION INTRAVENOUS CONTINUOUS
Status: CANCELLED | OUTPATIENT
Start: 2025-02-20

## 2025-02-20 RX ORDER — DEXAMETHASONE SODIUM PHOSPHATE 4 MG/ML
INJECTION, SOLUTION INTRA-ARTICULAR; INTRALESIONAL; INTRAMUSCULAR; INTRAVENOUS; SOFT TISSUE
Status: DISCONTINUED | OUTPATIENT
Start: 2025-02-20 | End: 2025-02-20

## 2025-02-20 RX ORDER — HYDROMORPHONE HYDROCHLORIDE 2 MG/ML
0.5 INJECTION, SOLUTION INTRAMUSCULAR; INTRAVENOUS; SUBCUTANEOUS
Refills: 0 | Status: CANCELLED | OUTPATIENT
Start: 2025-02-20

## 2025-02-20 RX ORDER — SODIUM CHLORIDE 0.9 % (FLUSH) 0.9 %
10 SYRINGE (ML) INJECTION
Status: DISCONTINUED | OUTPATIENT
Start: 2025-02-20 | End: 2025-02-20

## 2025-02-20 RX ORDER — FENTANYL CITRATE 50 UG/ML
25 INJECTION, SOLUTION INTRAMUSCULAR; INTRAVENOUS EVERY 5 MIN PRN
Status: DISCONTINUED | OUTPATIENT
Start: 2025-02-20 | End: 2025-02-20

## 2025-02-20 RX ORDER — DEXTROSE MONOHYDRATE AND SODIUM CHLORIDE 5; .9 G/100ML; G/100ML
INJECTION, SOLUTION INTRAVENOUS CONTINUOUS
Status: DISCONTINUED | OUTPATIENT
Start: 2025-02-20 | End: 2025-02-20

## 2025-02-20 RX ORDER — ROCURONIUM BROMIDE 10 MG/ML
INJECTION, SOLUTION INTRAVENOUS
Status: DISCONTINUED | OUTPATIENT
Start: 2025-02-20 | End: 2025-02-20

## 2025-02-20 RX ORDER — HYDROMORPHONE HYDROCHLORIDE 2 MG/ML
0.5 INJECTION, SOLUTION INTRAMUSCULAR; INTRAVENOUS; SUBCUTANEOUS EVERY 4 HOURS PRN
Status: DISCONTINUED | OUTPATIENT
Start: 2025-02-20 | End: 2025-02-21 | Stop reason: HOSPADM

## 2025-02-20 RX ORDER — PROPOFOL 10 MG/ML
VIAL (ML) INTRAVENOUS
Status: DISCONTINUED | OUTPATIENT
Start: 2025-02-20 | End: 2025-02-20

## 2025-02-20 RX ORDER — METOPROLOL TARTRATE 1 MG/ML
5 INJECTION, SOLUTION INTRAVENOUS EVERY 6 HOURS PRN
Status: DISCONTINUED | OUTPATIENT
Start: 2025-02-20 | End: 2025-02-21 | Stop reason: HOSPADM

## 2025-02-20 RX ADMIN — ROCURONIUM BROMIDE 20 MG: 10 INJECTION, SOLUTION INTRAVENOUS at 12:02

## 2025-02-20 RX ADMIN — HYDROMORPHONE HYDROCHLORIDE 0.5 MG: 2 INJECTION INTRAMUSCULAR; INTRAVENOUS; SUBCUTANEOUS at 09:02

## 2025-02-20 RX ADMIN — PANTOPRAZOLE SODIUM 40 MG: 40 INJECTION, POWDER, LYOPHILIZED, FOR SOLUTION INTRAVENOUS at 08:02

## 2025-02-20 RX ADMIN — HYDROMORPHONE HYDROCHLORIDE 0.5 MG: 2 INJECTION INTRAMUSCULAR; INTRAVENOUS; SUBCUTANEOUS at 01:02

## 2025-02-20 RX ADMIN — HYDROMORPHONE HYDROCHLORIDE 0.5 MG: 2 INJECTION INTRAMUSCULAR; INTRAVENOUS; SUBCUTANEOUS at 07:02

## 2025-02-20 RX ADMIN — PHENYLEPHRINE HYDROCHLORIDE 200 MCG: 10 INJECTION INTRAVENOUS at 12:02

## 2025-02-20 RX ADMIN — HYDROMORPHONE HYDROCHLORIDE 0.2 MG: 2 INJECTION INTRAMUSCULAR; INTRAVENOUS; SUBCUTANEOUS at 01:02

## 2025-02-20 RX ADMIN — PROPOFOL 150 MG: 10 INJECTION, EMULSION INTRAVENOUS at 12:02

## 2025-02-20 RX ADMIN — LIDOCAINE HYDROCHLORIDE 60 MG: 20 INJECTION, SOLUTION EPIDURAL; INFILTRATION; INTRACAUDAL; PERINEURAL at 12:02

## 2025-02-20 RX ADMIN — DEXTROSE AND SODIUM CHLORIDE: 5; 900 INJECTION, SOLUTION INTRAVENOUS at 04:02

## 2025-02-20 RX ADMIN — ONDANSETRON 4 MG: 2 INJECTION INTRAMUSCULAR; INTRAVENOUS at 07:02

## 2025-02-20 RX ADMIN — ONDANSETRON 4 MG: 2 INJECTION INTRAMUSCULAR; INTRAVENOUS at 12:02

## 2025-02-20 RX ADMIN — FENTANYL CITRATE 100 MCG: 50 INJECTION, SOLUTION INTRAMUSCULAR; INTRAVENOUS at 12:02

## 2025-02-20 RX ADMIN — PIPERACILLIN SODIUM AND TAZOBACTAM SODIUM 4.5 G: 4; .5 INJECTION, POWDER, LYOPHILIZED, FOR SOLUTION INTRAVENOUS at 08:02

## 2025-02-20 RX ADMIN — SODIUM CHLORIDE: 9 INJECTION, SOLUTION INTRAVENOUS at 05:02

## 2025-02-20 RX ADMIN — ONDANSETRON 4 MG: 2 INJECTION INTRAMUSCULAR; INTRAVENOUS at 01:02

## 2025-02-20 RX ADMIN — PIPERACILLIN SODIUM AND TAZOBACTAM SODIUM 4.5 G: 4; .5 INJECTION, POWDER, LYOPHILIZED, FOR SOLUTION INTRAVENOUS at 12:02

## 2025-02-20 RX ADMIN — PIPERACILLIN SODIUM AND TAZOBACTAM SODIUM 4.5 G: 4; .5 INJECTION, POWDER, LYOPHILIZED, FOR SOLUTION INTRAVENOUS at 04:02

## 2025-02-20 RX ADMIN — ROCURONIUM BROMIDE 30 MG: 10 INJECTION, SOLUTION INTRAVENOUS at 12:02

## 2025-02-20 RX ADMIN — SUGAMMADEX 200 MG: 100 INJECTION, SOLUTION INTRAVENOUS at 12:02

## 2025-02-20 RX ADMIN — DEXAMETHASONE SODIUM PHOSPHATE 4 MG: 4 INJECTION, SOLUTION INTRA-ARTICULAR; INTRALESIONAL; INTRAMUSCULAR; INTRAVENOUS; SOFT TISSUE at 12:02

## 2025-02-20 RX ADMIN — ONDANSETRON 4 MG: 2 INJECTION INTRAMUSCULAR; INTRAVENOUS at 09:02

## 2025-02-20 RX ADMIN — SODIUM CHLORIDE: 9 INJECTION, SOLUTION INTRAVENOUS at 12:02

## 2025-02-20 NOTE — ED PROVIDER NOTES
Encounter Date: 2025       History     Chief Complaint   Patient presents with    Abdominal Pain     Nausea , vomiting and abdominal burning pain radiating to back denies dysuria onset today      Patient is a 39-year-old female who presents with a chief complaint of nausea and vomiting that began abruptly today along with epigastric and right upper quadrant pain that is radiating to her back.  She complains of severe pain.  She is very uncomfortable on examination.      Review of patient's allergies indicates:   Allergen Reactions    Morphine Nausea Only     No past medical history on file.  Past Surgical History:   Procedure Laterality Date     SECTION  2016    TONSILLECTOMY  2003    TUBAL LIGATION  2016     Family History   Problem Relation Name Age of Onset    Arthritis Mother Zoie Roberts     Hypertension Father Genaro Roberts     Cancer Maternal Grandfather Ally Prajapati     Arthritis Maternal Grandmother Violetta Prajapati     Stroke Maternal Grandmother Violetta Prajapati     Hypertension Paternal Grandfather Giovanny Jonesirier     Cancer Paternal Grandmother Nel Armando     Diabetes Paternal Grandmother Nel Armando     Hypertension Paternal Grandmother Nel Armando      Social History[1]  Review of Systems   Constitutional:  Negative for chills and fever.   HENT: Negative.  Negative for congestion, sore throat and trouble swallowing.    Eyes:  Negative for discharge and visual disturbance.   Respiratory:  Negative for cough and shortness of breath.    Cardiovascular:  Negative for chest pain and palpitations.   Gastrointestinal:  Positive for abdominal pain and nausea. Negative for diarrhea and vomiting.   Endocrine: Negative.    Genitourinary:  Negative for flank pain and hematuria.   Musculoskeletal:  Negative for myalgias.   Skin:  Negative for rash.   Neurological:  Negative for dizziness and headaches.   Psychiatric/Behavioral:  Negative for hallucinations and suicidal ideas.     All other systems reviewed and are negative.      Physical Exam     Initial Vitals [02/19/25 1828]   BP Pulse Resp Temp SpO2   104/70 (!) 137 18 98.9 °F (37.2 °C) 100 %      MAP       --         Physical Exam    Constitutional: She appears well-developed and well-nourished. She appears distressed.   HENT:   Head: Normocephalic and atraumatic.   Eyes: EOM are normal. Pupils are equal, round, and reactive to light.   Neck: Trachea normal. Neck supple.    Full passive range of motion without pain.     Cardiovascular:  Normal rate, regular rhythm and normal pulses.           Abdominal: Bowel sounds are normal. There is abdominal tenderness in the right upper quadrant and epigastric area.     There is guarding and positive Romero's sign.   Musculoskeletal:      Cervical back: Full passive range of motion without pain and neck supple.      Comments: No deformity, Nl ROM     Lymphadenopathy:     She has no cervical adenopathy.   Neurological: She is alert and oriented to person, place, and time. She has normal strength. GCS eye subscore is 4. GCS verbal subscore is 5. GCS motor subscore is 6.   Skin: Skin is warm and intact. Capillary refill takes less than 2 seconds.   Psychiatric: She is not actively hallucinating. She expresses no homicidal and no suicidal ideation.         ED Course   Procedures  Labs Reviewed   COMPREHENSIVE METABOLIC PANEL - Abnormal       Result Value    Sodium 138      Potassium 3.8      Chloride 104      CO2 23      Glucose 98      Blood Urea Nitrogen 18.9 (*)     Creatinine 0.65      Calcium 8.8      Protein Total 7.3      Albumin 3.8      Globulin 3.5      Albumin/Globulin Ratio 1.1      Bilirubin Total 1.4      ALP 40      ALT 13      AST 18      eGFR >60      Anion Gap 11.0      BUN/Creatinine Ratio 29     URINALYSIS, REFLEX TO URINE CULTURE - Abnormal    Color, UA Yellow      Appearance, UA Clear      Specific Gravity, UA >=1.030      pH, UA 5.5      Protein, UA Negative      Glucose, UA  500 (*)     Ketones, UA >=80 (*)     Blood, UA Trace (*)     Bilirubin, UA Small (*)     Urobilinogen, UA 0.2      Nitrites, UA Negative      Leukocyte Esterase, UA Negative     CBC WITH DIFFERENTIAL - Abnormal    WBC 8.98      RBC 4.07 (*)     Hgb 12.8      Hct 38.1      MCV 93.6      MCH 31.4 (*)     MCHC 33.6      RDW 13.3      Platelet 195      MPV 10.1      Neut % 94.8      Lymph % 2.1      Mono % 2.8      Eos % 0.0      Basophil % 0.2      Imm Grans % 0.1      Neut # 8.51      Lymph # 0.19 (*)     Mono # 0.25      Eos # 0.00      Baso # 0.02      Imm Gran # 0.01     URINALYSIS, MICROSCOPIC - Abnormal    Bacteria, UA None Seen      RBC, UA 0-2      WBC, UA None Seen      Squamous Epithelial Cells, UA Moderate (*)    LIPASE - Normal    Lipase Level 18     LACTIC ACID, PLASMA - Normal    Lactic Acid Level 1.1     BLOOD CULTURE [facility]   BLOOD CULTURE [facility]   CBC W/ AUTO DIFFERENTIAL    Narrative:     The following orders were created for panel order CBC W/ AUTO DIFFERENTIAL.  Procedure                               Abnormality         Status                     ---------                               -----------         ------                     CBC with Differential[[id number]]       Abnormal            Final result                 Please view results for these tests on the individual orders.          Imaging Results              CT Abdomen Pelvis With IV Contrast NO Oral Contrast (Final result)  Result time 02/19/25 20:50:47      Final result by Abdiaziz Marsh MD (02/19/25 20:50:47)                   Impression:      1. Fluid-filled distal loops of small bowel with mild mural thickening suggest enteritis.  Please correlate clinically.    2. No CT suggestion of acute cholecystitis.    3. Details of other findings above.      Electronically signed by: Abdiaziz Marsh  Date:    02/19/2025  Time:    20:50               Narrative:    EXAMINATION:  CT ABDOMEN PELVIS WITH IV CONTRAST    CLINICAL HISTORY:  Abdominal burning  pain radiating to back.  Nausea and vomiting.  Abdominal pain    TECHNIQUE:  Multidetector axial images were obtained of the abdomen and pelvis following the administration of IV contrast. Oral contrast was not administered.    Dose length product of 585 mGycm. Automated exposure control was utilized to minimize radiation dose.    COMPARISON:  None available.    FINDINGS:  Included portion of the lungs are without suspicious nodularity, acute air space infiltrates or fluid within the pleural spaces.    Hepatic volume and attenuation is unremarkable. Gallbladder wall is not thickened and there is no intra luminal calcified calculus. No biliary dilation identified. Pancreatic unremarkable attenuation without acute peripancreatic phlegmons. Main pancreatic duct is not dilated. Spleen is of normal size without focal lesion.    The adrenal glands appear within normal limits. The kidneys are unremarkable in size and contour. No solid or cystic renal lesion identified. There is no hydronephrosis. No perinephric fluid strandings or collections identified.    Stomach is not distended.  There are mid to distal fluid-filled loops of small bowel with perhaps mild mural thickening.  No transition bowel obstruction.  Appendix appears unremarkable on image 64 series 3.  There is colonic fecal loading throughout which suggests constipation.  No transition or bowel obstruction.  No free fluid.    Urinary bladder wall is not thickened.  Uterus is retroverted without prominence of the endometrium.  There is no pelvic free fluid.    No acute or otherwise osseous abnormality identified.                                       Medications   sodium chloride 0.9% bolus 1,000 mL 1,000 mL (0 mLs Intravenous Stopped 2/19/25 2030)   HYDROmorphone (PF) injection 0.5 mg (0.5 mg Intravenous Given 2/19/25 1921)   ondansetron injection 8 mg (8 mg Intravenous Given 2/19/25 1921)   ketorolac injection 30 mg (30 mg Intravenous Given 2/19/25 1933)    piperacillin-tazobactam (ZOSYN) 4.5 g in D5W 100 mL IVPB (MB+) (0 g Intravenous Stopped 2/19/25 2031)   iohexoL (OMNIPAQUE 350) injection 100 mL (100 mLs Intravenous Given 2/19/25 2019)   acetaminophen 1,000 mg/100 mL (10 mg/mL) injection 1,000 mg (0 mg Intravenous Stopped 2/19/25 2124)   0.9% NaCl infusion (1,000 mLs Intravenous New Bag 2/19/25 2102)     Medical Decision Making  CBC was obtained to evaluate for anemia and for evidence of infection that could be seen with a white blood cell count elevation.    A CMP was ordered to evaluate the renal function for evidence of acute kidney injury based on an elevated creatinine, uremia based on an elevated BUN or suggestion of dehydration.  We will also be able to screen for liver disease by assessing the AST/ALT levels.  Biliary obstruction can be assessed by reviewing the alkaline phosphatase and bilirubin levels.  Electrolyte abnormalities will also be ruled out with levels of the potassium, sodium and magnesium levels being evaluated.  A glucose reading will rule out hypo or hyperglycemia.  Will calculate the anion gap and assess for metabolic acidosis.    A urinalysis will be done to rule out UTI.  Hematuria on the urinalysis may suggest a kidney stone or other pathology.  We will also assess for proteinuria on the urinalysis.    A CT scan of the abdomen was done to rule out intra-abdominal processes that could require urgent surgical intervention such as acute appendicitis, acute cholecystitis, small bowel obstruction or evidence of perforation with free air or perhaps mesenteric ischemia.  The aorta can also be assessed to assess for aneurysmal change.  Evidence of pancreatitis can also be evaluated by review of the CT images.  Nonsurgical issues such as constipation, mesenteric adenitis, cholelithiasis without cholecystitis or colitis/enteritis can also be assessed.  Nephrolithiasis/ureterolithiasis with or without evidence of hydronephrosis can also be  assessed      Amount and/or Complexity of Data Reviewed  Labs: ordered.  Radiology: ordered.    Risk  Prescription drug management.               ED Course as of 02/20/25 0021   Wed Feb 19, 2025 2059 Patient will need to be transferred for evaluation by general surgery for acute cholecystitis.  She is active fever severe right upper quadrant pain radiating to the back and a positive Romero's sign on exam.  COVID and influenza as a cause of her fever was ruled out earlier today at urgent care and results were visible in epic.  CT scan is unremarkable at this time.  It does demonstrate enteritis which could be contributing but she will still need to be ruled out for acute cholecystitis regardless of whether the concomitant enteritis is present. [DB]   Thu Feb 20, 2025   0010 We do not have the appropriate services available to continue with definitive care of the patient available at our facility.  The physician I consulted with at the accepting facility expressed an understanding of the patient's condition and the need for transfer.  The transfer has been accepted.     The patient has been re-evaluated before the transfer, and the condition remains stable.  I have Informed the pt and family (if available) that there is a need for transfer for a higher level of care and for services unavailable at our facility.  I have discussed test results, shared the treatment plan, and discussed the need for transfer with the patient and/or family at the bedside.  All historical, clinical, radiographic, and laboratory findings were reviewed with the patient/family in detail.  I feel that transfer is medically necessary at this time.  I have discussed, and it is understood, that there could be unforeseen motor vehicle accidents or loss of vital signs while en route to the transferring facility, resulting in potential death or permanent disability. Pt and/or family express understanding at this time and agree to proceed with the  transfer.  All questions were answered.  Pt and family have no further questions or concerns at this time. Pt is ready for transfer.   [DB]   0010 Accepted by Dr. Garcia [DB]      ED Course User Index  [DB] Gigi Sterling MD                           Clinical Impression:  Final diagnoses:  [K81.0] Acute cholecystitis (Primary)  [R50.9] Fever, unspecified fever cause  [R10.11] Right upper quadrant abdominal pain          ED Disposition Condition    Transfer to Another Facility Stable                  Gigi Sterling MD  02/20/25 0021         [1]   Social History  Tobacco Use    Smoking status: Never    Smokeless tobacco: Never   Substance Use Topics    Alcohol use: Not Currently     Alcohol/week: 1.0 - 2.0 standard drink of alcohol     Types: 1 - 2 Drinks containing 0.5 oz of alcohol per week    Drug use: Never        Gigi Sterling MD  02/23/25 0438

## 2025-02-20 NOTE — CONSULTS
Present during examination : Darcy Chavez RN    Patient is a 39-year-old  female with midepigastric right upper quadrant abdominal pain nausea bloating vomiting inability maintain diet transferred from [facility] with a possible acute cholecystitis.  Patient had an ultrasound that demonstrated stones within the gallbladder.  White count was 5 hemoglobin 11 hematocrit 33 platelet count was 158.  Patient's renal profile was unremarkable.  Albumin was 3.2.  Patient had about an 80 lb weight loss from 240-160 on Ozempic for the last year.  Ultrasound of the abdomen shows evidence of gallstones with a distended gallbladder wall.    Allergies   1. Morphine causes nausea       Past medical history   1. Anxiety on BuSpar  2. Hypertension   3. Type 2 diabetes with an A1c of 5.4   4. Hypercholesterolemia    Surgical history   1.  x1   2. Tonsillectomy   3. Tubal ligation   4. No EGD or colonoscopy  5. No stress test echo or angiogram   6. Pap smear last year   7. No mammogram       Immunizations   1. Tetanus shot over 5 years ago  2. Flu shot in 2024   3. No pneumonia shot  4. Had a COVID shot in  and also x3  5. No shingle shot   6. No hepatitis shot     Family history   1. Paternal grandfather had esophageal carcinoma   2. Paternal grandmother had breast cancer   3. Mother and father have arthritis hypertension in her living without any issues     Social history   1. No smoking   2. Drinks occasionally 6 times a year Susannah is 1 going out with the girls   3. No drug abuse history   4. No [employer] parole or probation   5. No  service   6. No rehab   7. 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   twice  once has a  AB0 0 has a 14 , 10, 8-year-old with a 2nd marriage  10. Lives in [place]  11. Primary care provider is  nurse practitioner and Gene Eugenio    Review of systems   Twelve point review of systems unremarkable       Physical exam   Patient is 5 ft 8 in 160 lb wears glasses  Head ears nose throat is normal   Heart is regular rate rhythm   Lungs are clear to auscultation  Abdomen is soft nontender nondistended   Extremities have no clubbing cyanosis edema   Neurologically intact with no motor or sensory asymmetry       Laboratory studies   Noted above     Assessment   Patient is a 39-year-old  female with midepigastric right upper quadrant abdominal pain nausea bloating vomiting inability maintain diet transferred from [facility] with a possible acute cholecystitis.  Patient had an ultrasound that demonstrated stones within the gallbladder.  White count was 5 hemoglobin 11 hematocrit 33 platelet count was 158.  Patient's renal profile was unremarkable.  Albumin was 3.2.  Patient had about an 80 lb weight loss from 240-160 on Ozempic for the last year.  Ultrasound of the abdomen shows evidence of gallstones with a distended gallbladder wall.    Plan   1. IV fluids   2. IV antibiotics   3. Laparoscopic cholecystectomy

## 2025-02-20 NOTE — ANESTHESIA PREPROCEDURE EVALUATION
02/20/2025  Mack Riley is a 39 y.o., female.      Pre-op Assessment    I have reviewed the Patient Summary Reports.     I have reviewed the Nursing Notes. I have reviewed the NPO Status.   I have reviewed the Medications.     Review of Systems  Anesthesia Hx:             Denies Family Hx of Anesthesia complications.    Denies Personal Hx of Anesthesia complications.                    Social:  No Alcohol Use, Non-Smoker       Hematology/Oncology:  Hematology Normal   Oncology Normal                                   EENT/Dental:  EENT/Dental Normal           Cardiovascular:     Hypertension, well controlled              ECG has been reviewed.                            Pulmonary:  Pulmonary Normal                       Renal/:  Renal/ Normal                 Hepatic/GI:  Hepatic/GI Normal                    Musculoskeletal:  Musculoskeletal Normal                Neurological:  Neurology Normal                                      Endocrine:  Diabetes, well controlled, type 2           Dermatological:  Skin Normal    Psych:  Psychiatric Normal                    Physical Exam  General: Cooperative, Alert and Oriented    Airway:  Mallampati: II   Mouth Opening: Normal  TM Distance: Normal  Tongue: Normal  Neck ROM: Normal ROM    Dental:  Intact        Anesthesia Plan  Type of Anesthesia, risks & benefits discussed:    Anesthesia Type: Gen ETT  Intra-op Monitoring Plan: Standard ASA Monitors  Post Op Pain Control Plan: multimodal analgesia  Induction:  IV  Airway Plan: Direct  Informed Consent: Patient consented to blood products? Yes  ASA Score: 3    Ready For Surgery From Anesthesia Perspective.     .

## 2025-02-20 NOTE — TRANSFER OF CARE
"Anesthesia Transfer of Care Note    Patient: Mack Riley    Procedure(s) Performed: Procedure(s) (LRB):  CHOLECYSTECTOMY, LAPAROSCOPIC, WITH CHOLANGIOGRAM (N/A)    Patient location: PACU    Anesthesia Type: general    Transport from OR: Transported from OR on room air with adequate spontaneous ventilation    Post pain: adequate analgesia    Post assessment: no apparent anesthetic complications    Post vital signs: stable    Level of consciousness: responds to stimulation and sedated    Nausea/Vomiting: no nausea/vomiting    Complications: none    Transfer of care protocol was followed      Last vitals: Visit Vitals  BP (!) 94/58   Pulse 96   Temp 37 °C (98.6 °F) (Oral)   Resp 20   Ht 5' 8" (1.727 m)   Wt 72.7 kg (160 lb 3.2 oz)   LMP 02/19/2025   SpO2 98%   Breastfeeding No   BMI 24.36 kg/m²     "

## 2025-02-20 NOTE — ANESTHESIA POSTPROCEDURE EVALUATION
Anesthesia Post Evaluation    Patient: Mack Riley    Procedure(s) Performed: Procedure(s) (LRB):  CHOLECYSTECTOMY, LAPAROSCOPIC, WITH CHOLANGIOGRAM (N/A)    Final Anesthesia Type: general      Patient location during evaluation: PACU  Patient participation: Yes- Able to Participate  Level of consciousness: awake and alert  Post-procedure vital signs: reviewed and stable  Pain management: adequate  Airway patency: patent  MEVLA mitigation strategies: Multimodal analgesia, Verification of full reversal of neuromuscular block and Extubation and recovery carried out in lateral, semiupright, or other nonsupine position  PONV status at discharge: No PONV  Anesthetic complications: no      Cardiovascular status: hemodynamically stable  Respiratory status: unassisted, spontaneous ventilation and room air  Hydration status: euvolemic  Follow-up not needed.              Vitals Value Taken Time   /78 02/20/25 12:57   Temp  02/20/25 12:57   Pulse 83 02/20/25 12:57   Resp 17 02/20/25 12:57   SpO2 100 % 02/20/25 12:57   Vitals shown include unfiled device data.      No case tracking events are documented in the log.      Pain/Coco Score: Presence of Pain: complains of pain/discomfort (2/20/2025  1:45 AM)  Pain Rating Prior to Med Admin: 8 (2/20/2025  7:51 AM)  Pain Rating Post Med Admin: 3 (2/19/2025  8:46 PM)

## 2025-02-20 NOTE — H&P
Chief Complaint; transferred from [facility] ER for management of probable acute cholecystitis    HPI:   Patient is a 39 y.o. female with a medical history of hypertension, type 2 diabetes mellitus and seasonal allergy who was transferred from [facility] ER for management of probable acute cholecystitis.    Patient has been at her usual state of health until yesterday when he developed severe right upper quadrant abdominal pain, sharp, about 10/10 in intensity, radiating to the back with no aggravating or relieving factor.  Associated with episodes of nausea and nonbloody vomiting.  She also complains of fever.  No chest pain or shortness of breadth.  No dizziness.  She decided to go to the ER at [facility] where she was evaluated.  She had a CTA of the abdomen, it shows fluid filled distal loop of small bowel mild mural thickening suggestive of enteritis, however, the ER attending was suspicious of clinical acute cholecystitis.  Therefore, the patient has been transferred to our facility for continuation of care.  He was also found to have persistent fever with sinus tachycardia.  Blood culture pending.  Urinalysis does not indicate UTI.  Patient was placed on IV fluids and empiric IV Zosyn.  At the time of my evaluation, patient is still having intermittent right upper quadrant abdominal pain.  Associated with nausea but no vomiting at this time.  Plan is for gallbladder ultrasound and for consultation with a general surgeon further evaluation in the morning and for possible gallbladder surgery    PCP Sheri Becker MD MD        No past medical history on file.  Significant for hypertension, type 2 diabetes mellitus and seasonal allergy    Past Surgical History:   Procedure Laterality Date     SECTION  2016    TONSILLECTOMY  2003    TUBAL LIGATION  2016        Prescriptions Prior to Admission[1] please see medication reconciliation chart    Review of patient's allergies  "indicates:   Allergen Reactions    Morphine Nausea Only        Social History     Tobacco Use    Smoking status: Never    Smokeless tobacco: Never   Substance Use Topics    Alcohol use: Not Currently     Alcohol/week: 1.0 - 2.0 standard drink of alcohol     Types: 1 - 2 Drinks containing 0.5 oz of alcohol per week        Family History   Problem Relation Name Age of Onset    Arthritis Mother Zoie Roberts     Hypertension Father Genaro Roberts     Cancer Maternal Grandfather Ally Prajapati     Arthritis Maternal Grandmother Violetta Prajapati     Stroke Maternal Grandmother Violetta Prajapati     Hypertension Paternal Grandfather Giovanny Jonesirier     Cancer Paternal Grandmother Nel Armando     Diabetes Paternal Grandmother Nel Armando     Hypertension Paternal Grandmother Nel Jonesirier        Review of Systems  Review of Systems   Constitutional:  Persistent fever  HEENT: Negative for drooling, ear pain, facial swelling and nosebleeds.    Eyes: Negative for discharge and visual disturbance.   Respiratory: Negative for cough, negative shortness of breath.    Cardiovascular: negative for chest pain or SOB  Gastrointestina; right upper quadrant abdominal pain, nausea and vomiting.     Genitourinary: Negative for decreased urine volume and dysuria  Musculoskeletal: Negative for neck pain.   Skin: Negative for rash.   Neurological: negative for numbness, negative for weakness,negative for seizures and facial asymmetry.              Objective:     No intake/output data recorded.    /70 (BP Location: Left arm, Patient Position: Lying)   Pulse (!) 116   Temp 99.5 °F (37.5 °C) (Oral)   Resp 20   Ht 5' 8" (1.727 m)   Wt 72.7 kg (160 lb 3.2 oz)   LMP 02/19/2025   SpO2 97%   Breastfeeding No   BMI 24.36 kg/m²     General Appearance:    Awake, alert, not in acute distress   HEENT:   atraumatic, PERRL, EOM intact, conjuctiva pink, sclera anicteric, oropharynx moist, no lesions noted   Neck:    Supple, no JVD, no " "carotid bruits, no lymphadenopathy or thyromegaly noted       Pulmonary:   Clear to auscultation bilaterally, reduced breath sounds bileterally.   Cardiovascular:    Regular rate and rhythm, S1 S2 normal   Abdomen:     Soft, right upper quadrant abdominal tenderness,nondistended, bowel sounds active all four quadrants, no masses, no organomegaly   Extremities:  no edema, no cyanosis or clubbing noted       Skin:   No bruises noted.       Neurologic: Alert, awake, oriented x 3, moves all extremities.                 Data Review :   Labs:    CBC:   Lab Results   Component Value Date    WBC 8.98 02/19/2025    RBC 4.07 (L) 02/19/2025    HGB 12.8 02/19/2025    HCT 38.1 02/19/2025     02/19/2025     CMP:   Lab Results   Component Value Date     02/19/2025    K 3.8 02/19/2025     02/19/2025    CO2 23 02/19/2025    BUN 18.9 (H) 02/19/2025    CREATININE 0.65 02/19/2025    CALCIUM 8.8 02/19/2025    ALKPHOS 40 02/19/2025    AST 18 02/19/2025    ALT 13 02/19/2025    ALBUMIN 3.8 02/19/2025    BILITOT 1.4 02/19/2025     Cardiac markers: No results found for: "BNP", "CKMB", "CKTOTAL", "TROPONIN", "MYOGLOBIN"    Radiology:  Micro:  No components found for: "BLOODCX", "SPUTUMCX", "URINECX"    Radiology:  [unfilled]        Assessment & Plan:   1. Persistent right upper quadrant abdominal pain; this may be suggestive of gallbladder disease given persistent right upper quadrant abdominal pain and tenderness, fever.  Although, CTA of the abdomen indicates findings suggestive of enteritis.  With no gallbladder stone.  Patient has been transferred to our facility for gallbladder ultrasound and for consultation with a general surgeon.  Lipase level was normal.  Consult general surgeon in the morning, keep patient NPO, IV fluids, empiric antibiotics and analgesics and IV Protonix.    2. Probable sepsis; due to persistent fever with tachycardia, maybe related to gallbladder disease, continue above-stated management and " follow up the blood culture results.    3. Sinus tachycardia; likely related to sepsis, continue IV fluids, place on IV metoprolol p.r.n..  Obtain a TSH level improving at this time.  EKG indicates sinus tachycardia    4. Underlying hypertension; blood pressure relatively low, hold off on antihypertensive medication.    5. Type 2 diabetes mellitus; continue insulin sliding scale and obtain hemoglobin A1c in the morning.    6. Maintain the patient on DVT prophylaxis by way of SCD    Disposition; admit to inpatient status.  Follow up gallbladder ultrasound and follow up with further recommendation by the general surgeon    This note was completed using voice recognition software and transcription errors may occur.    This Encounter was via Telemedicine (Both audio and visual )     My Location; [place], [place]    Patient was located in [place], [place].    Evaluation  of the patient was done alongside the patient's nursing staff       Brooklyn Garcia  2/20/2025  4:06 AM    A 39 years old female was hospitalized for right upper quadrant abdominal pain possible secondary to acute cholecystitis     Abdominal CT scan shows enteritis, waiting for abdominal ultrasound and HIDA scan       [1]   Medications Prior to Admission   Medication Sig Dispense Refill Last Dose/Taking    busPIRone (BUSPAR) 5 MG Tab TAKE 1 TABLET BY MOUTH 3 TIMES A DAY AS NEEDED FOR ANXIETY 90 tablet 1     cyanocobalamin (VITAMIN B-12) 250 MCG tablet Take 250 mcg by mouth once daily.       famotidine (PEPCID) 40 MG tablet TAKE 1 TABLET BY MOUTH EVERY DAY 90 tablet 1     flash glucose scanning reader (FREESTYLE DENISE 2 READER) Misc For CGM 1 each 0     flash glucose sensor (FREESTYLE DENISE 2 SENSOR) Kit For CGM q 2 weeks,; E11.9 2 kit 11     fluticasone propionate (FLONASE) 50 mcg/actuation nasal spray 1 spray by Each Nostril route daily as needed.       lisinopriL 10 MG tablet Take 1 tablet (10 mg total) by mouth once daily. (Patient taking differently:  Take 10 mg by mouth every evening.) 90 tablet 3     lubiprostone (AMITIZA) 24 MCG Cap Take 24 mcg by mouth daily with breakfast.       montelukast (SINGULAIR) 10 mg tablet Take 10 mg by mouth once daily.       naproxen (NAPROSYN) 500 MG tablet Take 1 tablet (500 mg total) by mouth 2 (two) times daily. (Patient taking differently: Take 500 mg by mouth 2 (two) times daily as needed.) 60 tablet 0     ondansetron (ZOFRAN-ODT) 4 MG TbDL Take 1 tablet (4 mg total) by mouth every 12 (twelve) hours as needed (nausea). 30 tablet 0     pravastatin (PRAVACHOL) 40 MG tablet Take 40 mg by mouth every evening.       semaglutide (OZEMPIC) 2 mg/dose (8 mg/3 mL) PnIj Inject 2 mg into the skin every 7 days. QTuesday       triamcinolone acetonide 0.5% (KENALOG) 0.5 % Crea Apply topically 2 (two) times daily. 454 g 0     vitamin D (VITAMIN D3) 1000 units Tab Take 1,000 Units by mouth once daily.       XIGDUO XR 5-1,000 mg TBph Take 1 tablet by mouth every evening.

## 2025-02-20 NOTE — OP NOTE
[facility]  Medical Surgical Unit  Operative Note      Date of Procedure: 2/20/2025     Procedure: Procedure(s) (LRB):  CHOLECYSTECTOMY, LAPAROSCOPIC, WITH CHOLANGIOGRAM (N/A)   Normal intraoperative Cholangiogram  Surgeons and Role:     * Mario Rivera MD - Primary    Assisting Surgeon: None    Anesthesia Staff Present in Procedure:   Anesthesiologist: Ulises Reed DO  CRNA: Bradley Winchester CRNA; Mane Arce CRNA    OR Staff Present in Procedure:  Circulator: Miri Miles RN  Scrub Person: Giovanna Whiting STFA; Clay Bosch ST    Pre-Operative Diagnosis: Calculus of gallbladder with other cholecystitis without obstruction [K80.18]    Post-Operative Diagnosis: Post-Op Diagnosis Codes:     * Calculus of gallbladder with other cholecystitis without obstruction [K80.18]  CHOLECYSTECTOMY, LAPAROSCOPIC, WITH CHOLANGIOGRAM (N/A)   Normal intraoperative Cholangiogram  Anesthesia: General    Operative Findings (including complications, if any):  Patient is a 39-year-old  female with a history of midepigastric right upper quadrant abdominal pain nausea bloating vomiting inability maintain diet.  Patient is status post initiation of Ozempic 1 year ago lost 80 lb from 240-160.  Patient has since that time has been doing well but recently developed midepigastric right upper quadrant abdominal pain nausea bloating vomiting inability maintain diet.  Patient had sludge and stones in the gallbladder noted on ultrasonography.  Patient was referred for possible cholecystitis.  Patient was brought to the OR supine position general anesthetic prepped and draped in a sterile fashion had a supraumbilical incision made with insertion of Veress needle.  To 14 mm of mercury.  Patient had a 5 mm trocar placed under direct vision.  Patient then underwent insertion of 2 lateral 5 mm trocars and 1 5 mm trocar just to the right of falciform ligament under direct vision.  Patient underwent grasping and  mobilization of the gallbladder.  Cystic duct and artery were isolated triangle Calot was clearly dissected intraoperative cholangiogram was obtained.  Patient underwent clipping of the cystic duct and artery cauterization of gallbladder off the liver bed removed through the epigastric trocar site.  The aponeuroses of the 5 mm trocar sites were closed with 0 Vicryl on  needle.  SubQ was closed with 4-0 plain gut suture.  Dermabond was used for skin.  Sterile dressings were applied no problems were encountered patient tolerated procedure well.        Description of Technical Procedures:  Noted above       Significant Surgical Tasks Conducted by the Assistant(s), if Applicable:  None       Estimated Blood Loss (EBL): 5 mL           Implants: * No implants in log *    Specimens:   Specimen (24h ago, onward)       Start     Ordered    02/20/25 1232  Specimen to Pathology  RELEASE UPON ORDERING        References:    Click here for ordering Quick Tip   Question:  Release to patient  Answer:  Immediate    02/20/25 1232                            Condition: Good    Disposition: PACU - hemodynamically stable.    Attestation: I was present and scrubbed for the entire procedure.        Discharge Note    OUTCOME: Patient tolerated treatment/procedure well without complication and is now ready for discharge.          DISPOSITION: Home or Self Care    FINAL DIAGNOSIS:  <principal problem not specified>  Post-Op Diagnosis Codes:     * Calculus of gallbladder with other cholecystitis without obstruction [K80.18]    FOLLOWUP: In clinic  1 week    DISCHARGE INSTRUCTIONS:  No discharge procedures on file.

## 2025-02-20 NOTE — ANESTHESIA PROCEDURE NOTES
Intubation    Date/Time: 2/20/2025 12:06 PM    Performed by: Bradley Winchester CRNA  Authorized by: Ulises Reed DO    Intubation:     Induction:  Intravenous    Intubated:  Postinduction    Mask Ventilation:  Easy mask    Attempts:  1    Attempted By:  CRNA    Method of Intubation:  Direct    Blade:  Ruiz 2    Laryngeal View Grade: Grade I - full view of cords      Difficult Airway Encountered?: No      Complications:  None    Airway Device:  Oral endotracheal tube    Airway Device Size:  7.5    Style/Cuff Inflation:  Cuffed (inflated to minimal occlusive pressure)    Tube secured:  21    Secured at:  The lips    Placement Verified By:  Capnometry and Colorimetric ETCO2 device    Complicating Factors:  None    Findings Post-Intubation:  BS equal bilateral and atraumatic/condition of teeth unchanged

## 2025-02-21 VITALS
HEIGHT: 68 IN | WEIGHT: 160.19 LBS | DIASTOLIC BLOOD PRESSURE: 77 MMHG | OXYGEN SATURATION: 99 % | HEART RATE: 74 BPM | BODY MASS INDEX: 24.28 KG/M2 | SYSTOLIC BLOOD PRESSURE: 117 MMHG | TEMPERATURE: 98 F | RESPIRATION RATE: 18 BRPM

## 2025-02-21 LAB
POCT GLUCOSE: 73 MG/DL (ref 70–110)
POCT GLUCOSE: 80 MG/DL (ref 70–110)

## 2025-02-21 PROCEDURE — 63600175 PHARM REV CODE 636 W HCPCS: Performed by: INTERNAL MEDICINE

## 2025-02-21 PROCEDURE — 94761 N-INVAS EAR/PLS OXIMETRY MLT: CPT

## 2025-02-21 PROCEDURE — 25000003 PHARM REV CODE 250: Performed by: INTERNAL MEDICINE

## 2025-02-21 RX ORDER — TRAMADOL HYDROCHLORIDE 50 MG/1
50 TABLET ORAL EVERY 4 HOURS PRN
Qty: 15 TABLET | Refills: 0 | Status: SHIPPED | OUTPATIENT
Start: 2025-02-21

## 2025-02-21 RX ORDER — ONDANSETRON 4 MG/1
4 TABLET, ORALLY DISINTEGRATING ORAL 2 TIMES DAILY
Qty: 15 TABLET | Refills: 0 | Status: SHIPPED | OUTPATIENT
Start: 2025-02-21

## 2025-02-21 RX ADMIN — PIPERACILLIN SODIUM AND TAZOBACTAM SODIUM 4.5 G: 4; .5 INJECTION, POWDER, LYOPHILIZED, FOR SOLUTION INTRAVENOUS at 04:02

## 2025-02-21 RX ADMIN — ONDANSETRON 4 MG: 2 INJECTION INTRAMUSCULAR; INTRAVENOUS at 05:02

## 2025-02-21 RX ADMIN — HYDROMORPHONE HYDROCHLORIDE 0.5 MG: 2 INJECTION INTRAMUSCULAR; INTRAVENOUS; SUBCUTANEOUS at 05:02

## 2025-02-21 RX ADMIN — PANTOPRAZOLE SODIUM 40 MG: 40 INJECTION, POWDER, LYOPHILIZED, FOR SOLUTION INTRAVENOUS at 09:02

## 2025-02-21 NOTE — PLAN OF CARE
02/21/25 1041   Final Note   Assessment Type Final Discharge Note   Anticipated Discharge Disposition Home   Post-Acute Status   Discharge Delays None known at this time     D/c home.  No needs.

## 2025-02-21 NOTE — DISCHARGE SUMMARY
[facility] Hospital Medicine Discharge Summary    Admit Date: 2/20/2025  Discharge Date and Time: 2/21/20259:56 AM  Admitting Physician:  Team  Discharging Physician: Castro Portillo MD.  Primary Care Physician: Sheri Becker MD  Consults:     Discharge Diagnoses:  Acute cholecystitis with a cholelithiasis   Hypertension   Diabetes    Hospital Course:   Patient is a 39 y.o. female with a medical history of hypertension, type 2 diabetes mellitus and seasonal allergy who was transferred from [facility] ER for management of probable acute cholecystitis.    Patient has been at her usual state of health until yesterday when he developed severe right upper quadrant abdominal pain, sharp, about 10/10 in intensity, radiating to the back with no aggravating or relieving factor.  Associated with episodes of nausea and nonbloody vomiting.  She also complains of fever.  No chest pain or shortness of breadth.  No dizziness.  She decided to go to the ER at [facility] where she was evaluated.  She had a CTA of the abdomen, it shows fluid filled distal loop of small bowel mild mural thickening suggestive of enteritis, however, the ER attending was suspicious of clinical acute cholecystitis.  Therefore, the patient has been transferred to our facility for continuation of care.  He was also found to have persistent fever with sinus tachycardia.  Blood culture pending.  Urinalysis does not indicate UTI.  Patient was placed on IV fluids and empiric IV Zosyn.  At the time of my evaluation, patient is still having intermittent right upper quadrant abdominal pain.  Associated with nausea but no vomiting at this time.  Plan is for gallbladder ultrasound and for consultation with a general surgeon further evaluation in the morning and for possible gallbladder surgery    February 21, 2025-acute cholecystitis was treated with IV Zosyn, general surgeon performed a laparoscopic cholecystectomy  yesterday     The patient feels much better this morning, no nausea, no vomiting, patient tolerated oral intake     Patient can go home today and have a follow-up with the general surgeon     I gave the patient tramadol and Zofran prescription    Pt was seen and examined on the day of discharge  Vitals:  VITAL SIGNS: 24 HRS MIN & MAX LAST   Temp  Min: 97.7 °F (36.5 °C)  Max: 98.7 °F (37.1 °C) 98.2 °F (36.8 °C)   BP  Min: 94/58  Max: 127/88 117/77   Pulse  Min: 74  Max: 100  74   Resp  Min: 12  Max: 18 18   SpO2  Min: 96 %  Max: 100 % 99 %       Physical Exam:  Gen---alert and oriented times 3  Neck---supple  Lung---Bilaterally clear to auscutation  Heart---regular, S1,S2, no murmur, no gallop  Abd---soft,nontender, not distended,positive bowel sound, laparoscopic cholecystectomy site clean  Ext---no cynosis,no clubbing,no edema  Neuro--nonfocal  Psych---cooperative     Procedures Performed: No admission procedures for hospital encounter.     Significant Diagnostic Studies: See Full reports for all details    Recent Labs   Lab 02/19/25 1912 02/20/25  0425   WBC 8.98 5.87   RBC 4.07* 3.66*   HGB 12.8 11.3*   HCT 38.1 33.8*   MCV 93.6 92.3   MCH 31.4* 30.9   MCHC 33.6 33.4   RDW 13.3 13.8    158   MPV 10.1 9.8       Recent Labs   Lab 02/19/25 1912 02/20/25  0425    137   K 3.8 3.6    105   CO2 23 25   BUN 18.9* 15.0   CREATININE 0.65 0.75   CALCIUM 8.8 8.1*   ALBUMIN 3.8 3.2*   ALKPHOS 40 30*   ALT 13 9   AST 18 14   BILITOT 1.4 1.1        Microbiology Results (last 7 days)       ** No results found for the last 168 hours. **             SURG FL Surgery Fluoro Usage  See OP Notes for results.     IMPRESSION: See OP Notes for results.     This procedure was auto-finalized by: Virtual Radiologist  US Abdomen Limited_Gallbladder  Narrative: EXAMINATION:  US ABDOMEN LIMITED_GALLBLADDER    CLINICAL HISTORY:  ? cholecycstitis;, .    COMPARISON:  None available    FINDINGS:  The exam is limited to the  right upper abdomen. Multiple sonographic images reveal the pancreas to be suboptimally visualized. The IVC is grossly normal in size. The gallbladder demonstrates the multiple small stones and is mildly distended.  The gallbladder wall is not thickened.  No pericholecystic fluid is identified..  Romero sign is positive.  The common bile duct is not dilated. The liver is normal in size. The hepatic parenchyma is grossly within normal limits. No free fluid collection is seen. The right kidney is unremarkable.  Impression: 1. Suboptimal visualization of the pancreas.  2. Cholelithiasis with mildly distended gallbladder without gallbladder wall thickening or pericholecystic fluid.  A sonographic Romero sign.  A hepatobiliary scan would allow further evaluation    Electronically signed by: Raffy Lawson  Date:    02/20/2025  Time:    11:02         Medication List        START taking these medications      traMADoL 50 mg tablet  Commonly known as: ULTRAM  Take 1 tablet (50 mg total) by mouth every 4 (four) hours as needed.            CHANGE how you take these medications      ondansetron 4 MG Tbdl  Commonly known as: ZOFRAN-ODT  Take 1 tablet (4 mg total) by mouth 2 (two) times daily.  What changed:   when to take this  reasons to take this            CONTINUE taking these medications      FREESTYLE DENISE 2 READER Misc  Generic drug: flash glucose scanning reader  For CGM     FREESTYLE DENISE 2 SENSOR Kit  Generic drug: flash glucose sensor  For CGM q 2 weeks,; E11.9     lisinopriL 10 MG tablet  Take 1 tablet (10 mg total) by mouth once daily.     lubiprostone 24 MCG Cap  Commonly known as: AMITIZA     OZEMPIC 2 mg/dose (8 mg/3 mL) Pnij  Generic drug: semaglutide     pravastatin 40 MG tablet  Commonly known as: PRAVACHOL     XIGDUO XR 5-1,000 mg  Generic drug: dapaglifloz propaned-metformin            STOP taking these medications      busPIRone 5 MG Tab  Commonly known as: BUSPAR     cyanocobalamin 250 MCG  tablet  Commonly known as: VITAMIN B-12     famotidine 40 MG tablet  Commonly known as: PEPCID     fluticasone propionate 50 mcg/actuation nasal spray  Commonly known as: FLONASE     montelukast 10 mg tablet  Commonly known as: SINGULAIR     naproxen 500 MG tablet  Commonly known as: NAPROSYN     triamcinolone acetonide 0.5% 0.5 % Crea  Commonly known as: KENALOG     vitamin D 1000 units Tab  Commonly known as: VITAMIN D3               Where to Get Your Medications        These medications were sent to [facility]/pharmacy #[id number] - [facility] [address] [facility]  [address] [address]      Phone: [phone]   ondansetron 4 MG Tbdl  traMADoL 50 mg tablet          Explained in detail to the patient about the discharge plan, medications, and follow-up visits. Pt understands and agrees with the treatment plan  Discharge Disposition: Planned Readmission - Short Term Hospital   Discharged Condition: stable  Diet-   Dietary Orders (From admission, onward)       Start     Ordered    02/20/25 1814  Diet Clear Liquid Standard Tray  Diet effective now        Question:  Tray type:  Answer:  Standard Tray    02/20/25 1813                   Medications Per DC med rec  Activities as tolerated    For further questions contact hospitalist office    Discharge time 33 minutes    For worsening symptoms, chest pain, shortness of breath, increased abdominal pain, high grade fever, stroke or stroke like symptoms, immediately go to the nearest Emergency Room or call 911 as soon as possible.      Castro Ruiz M.D on 2/21/2025. at 9:56 AM.

## 2025-02-21 NOTE — PLAN OF CARE
02/21/25 1042   Discharge Assessment   Assessment Type Discharge Planning Assessment   Confirmed/corrected address, phone number and insurance Yes   Confirmed Demographics Correct on Facesheet   Source of Information patient   People in Home spouse   Do you expect to return to your current living situation? Yes   Do you have help at home or someone to help you manage your care at home? Yes   Who are your caregiver(s) and their phone number(s)? fly   Prior to hospitilization cognitive status: Alert/Oriented;No Deficits   Current cognitive status: Alert/Oriented;No Deficits   Walking or Climbing Stairs Difficulty no   Dressing/Bathing Difficulty no   Equipment Currently Used at Home glucometer   Patient currently being followed by outpatient case management? Unable to determine (comments)   Do you currently have service(s) that help you manage your care at home? No   Do you take prescription medications? Yes   Do you have prescription coverage? Yes   Do you have any problems affording any of your prescribed medications? No   Is the patient taking medications as prescribed? yes   Who is going to help you get home at discharge? fly   How do you get to doctors appointments? car, drives self;family or friend will provide   Are you on dialysis? No   Do you take coumadin? No   Discharge Plan A Home;Home with family   Discharge Plan B Home;Home with family   DME Needed Upon Discharge  none   Discharge Plan discussed with: Patient   Transition of Care Barriers None   Physical Activity   On average, how many days per week do you engage in moderate to strenuous exercise (like a brisk walk)? 0 days   On average, how many minutes do you engage in exercise at this level? 0 min   Financial Resource Strain   How hard is it for you to pay for the very basics like food, housing, medical care, and heating? Not very   Housing Stability   In the last 12 months, was there a time when you were not able to pay the mortgage or rent on  time? N   At any time in the past 12 months, were you homeless or living in a shelter (including now)? N   Transportation Needs   Has the lack of transportation kept you from medical appointments, meetings, work or from getting things needed for daily living? No   Food Insecurity   Within the past 12 months, you worried that your food would run out before you got the money to buy more. Never true   Within the past 12 months, the food you bought just didn't last and you didn't have money to get more. Never true   Stress   Do you feel stress - tense, restless, nervous, or anxious, or unable to sleep at night because your mind is troubled all the time - these days? Only a littl   Social Isolation   How often do you feel lonely or isolated from those around you?  Rarely   Alcohol Use   Q1: How often do you have a drink containing alcohol? Monthly or l   Q2: How many drinks containing alcohol do you have on a typical day when you are drinking? 1 or 2   Q3: How often do you have six or more drinks on one occasion? Never   Utilities   In the past 12 months has the electric, gas, oil, or water company threatened to shut off services in your home? No   Health Literacy   How often do you need to have someone help you when you read instructions, pamphlets, or other written material from your doctor or pharmacy? Sometimes

## 2025-02-24 ENCOUNTER — PATIENT OUTREACH (OUTPATIENT)
Dept: ADMINISTRATIVE | Facility: CLINIC | Age: 40
End: 2025-02-24
Payer: COMMERCIAL

## 2025-02-24 LAB — PSYCHE PATHOLOGY RESULT: NORMAL

## 2025-02-24 NOTE — PROGRESS NOTES
C3 nurse spoke with Mack Riley for a TCC post hospital discharge follow up call. The patient has a scheduled HOSFU/Post-Op appointment with Mario Rivera MD (general surgery) on 2/27/25 @ 5:45am. She does not have a HOSFU with Sheri Becker MD (PCP), but will call to schedule.     She was unsure why some medications were stopped and she still takes Buspar PRN, OTC Vitamins, Flonase PRN, Singulair PRN, and Naproxen OTC PRN. C3 nurse advised against using NSAIDS d/t risk of bleeding and she voiced understanding and will hold these until Post-Op FU. She is holding Pepcid at this time.

## 2025-02-25 LAB
BACTERIA BLD CULT: NORMAL
BACTERIA BLD CULT: NORMAL

## (undated) DEVICE — HOLDER SCALPEL SURGICAL GOLD

## (undated) DEVICE — DRAPE INCISE IOBAN 2 23X17IN

## (undated) DEVICE — POSITIONER HEEL FOAM CONVOLTD

## (undated) DEVICE — NDL SAFETY 22G X 1.5 ECLIPSE

## (undated) DEVICE — TROCAR KII SHLD BLDED 5X100MM

## (undated) DEVICE — ADHESIVE DERMABOND ADVANCED

## (undated) DEVICE — GLOVE SIGNATURE ESSNTL LTX 6.5

## (undated) DEVICE — BAG SPEC RETRV ENDO 4X6IN DISP

## (undated) DEVICE — SYR 10CC LUER LOCK

## (undated) DEVICE — SET TUB INSUFFLATION SUC 20L

## (undated) DEVICE — DRAPE DEVON INSTRUMENT 10X16IN

## (undated) DEVICE — SUPPORT ULNA NERVE PROTECTOR

## (undated) DEVICE — APPLIER CLIP ENDO LIGAMAX 5MM

## (undated) DEVICE — SYR 0.9% NACL 10ML STERILE

## (undated) DEVICE — CONTRAST ISOVUE M 300 15ML VIL

## (undated) DEVICE — SCISSOR 5MMX35CM DIRECT DRIVE

## (undated) DEVICE — KIT CLEANER SCOPE SWAB CLOTH

## (undated) DEVICE — SLEEVE KII ADV FIX 5X100MM

## (undated) DEVICE — DRAPE C ARM 42 X 120 10/BX

## (undated) DEVICE — BLANKET WARMING UPPER BODY

## (undated) DEVICE — SYR LUER LOCK 20ML

## (undated) DEVICE — SUT PLN GUT 2-0 CT-3 1X27

## (undated) DEVICE — CATH CHOLANGIOGRAM 13IN

## (undated) DEVICE — DISSECTOR EPIX LAPA 5MMX35CM

## (undated) DEVICE — IRRIGATOR HYDRO-SURG PLUS 5MM

## (undated) DEVICE — ELECTRODE MEGADYNE L-WIRE 33CM

## (undated) DEVICE — NDL PNEUMO INSUFFLATI 120MM

## (undated) DEVICE — Device